# Patient Record
Sex: FEMALE | Race: WHITE
[De-identification: names, ages, dates, MRNs, and addresses within clinical notes are randomized per-mention and may not be internally consistent; named-entity substitution may affect disease eponyms.]

---

## 2017-04-22 NOTE — PHYS DOC
Past Medical History


Past Medical History:  CAD, Cancer, MI


Additional Past Medical Histor:  CARDIAC ISSUES, RADIATION AND CHEMO FOR RIGHT 

LUNG CA


Past Surgical History:  Coronary Bypass Surgery, Knee Replacement


Alcohol Use:  None


Drug Use:  None





Adult General


Chief Complaint


Chief Complaint:  MECHANICAL FALL





HPI


HPI


Patient is a 62  year old female who presents with for left elbow and forearm 

lacerations, pain and bleeding; and bilateral knee pain after trip and fall at 

a store.  She tripped on a floor mat.  She notes pain to bilateral anterior 

knees that are achy, constant; but she was able to walk without difficulty.  

She notes falling into a wall and hitting her forehead and right shoulder, but 

has no headache or shoulder pain.  She denies LOC, neck pain, vision changes, 

dizziness, back pain, chest pain, dyspnea, numbness, tingling, weakness.  





Tdap up to date.





Review of Systems


Review of Systems





Constitutional: Denies fever or chills []


Eyes: Denies change in visual acuity, redness, or eye pain []


HENT: Denies nasal congestion or sore throat []


Respiratory: Denies cough or shortness of breath []


Cardiovascular: No additional information not addressed in HPI []


GI: Denies abdominal pain, nausea, vomiting, bloody stools or diarrhea []


: Denies dysuria or hematuria []


Musculoskeletal: Denies back pain or joint pain []


Integument: Denies rash or skin lesions []


Neurologic: Denies headache, focal weakness or sensory changes []


Endocrine: Denies polyuria or polydipsia []





Current Medications


Current Medications





 Current Medications








 Medications


  (Trade)  Dose


 Ordered  Sig/Everett  Start Time


 Stop Time Status Last Admin


Dose Admin


 


 Lidocaine/


 Epinephrine


  (Xylocaine


 1%-Epi 1:100,000)  20 ml  1X  ONCE  4/22/17 17:30


 4/22/17 17:31 DC 4/22/17 18:21


20 ML


 


 Oxycodone HCl


  (Roxicodone)  5 mg  1X  ONCE  4/22/17 18:45


 4/22/17 18:46 DC 4/22/17 18:41


5 MG











Allergies


Allergies





 Allergies








Coded Allergies Type Severity Reaction Last Updated Verified


 


  tramadol Allergy Severe Seizures 3/27/17 Yes


 


  Sulfa (Sulfonamide Antibiotics) Allergy Intermediate Itching & GI upset 3/27/

17 Yes


 


  lorazepam Allergy Intermediate Hives 3/27/17 Yes


 


  propoxyphene napsylate Allergy Intermediate Hives & GI upset 3/27/17 Yes


 


  I S O L A T I O N *CONTACT* Allergy Unknown  3/27/17 Yes


 


  morphine Adverse Reaction Severe  3/27/17 Yes


 


  fluticasone propionate Adverse Reaction Intermediate Anxious 3/27/17 Yes


 


  ibuprofen Adverse Reaction Intermediate NAUSEA/VOMITING 3/27/17 Yes


 


  salmeterol xinafoate Adverse Reaction Intermediate Anxious 3/27/17 Yes











Physical Exam


Physical Exam





Constitutional: Well developed, well nourished, no acute distress, non-toxic 

appearance. []


HENT: Normocephalic, atraumatic, bilateral external ears normal, oropharynx 

moist, no oral exudates, nose normal. []


Eyes: PERRLA, EOMI, conjunctiva normal, no discharge. [] 


Neck: Normal range of motion, no tenderness, supple. [] 


Cardiovascular:Heart rate regular rhythm []


Lungs & Thorax:  Bilateral breath sounds clear to auscultation []


Abdomen: Bowel sounds normal, soft, no tenderness. [] 


Skin: Warm, dry, no erythema, no rash. [] 


Back: No tenderness, no CVA tenderness. [] 


Extremities: No bony tenderness, ROM intact at all joints.  Has some ecchymosis 

to left anterior knee and left elbow. Left forearm with 4 cm of abrasion 

overlying 2 cm gaping laceration, left elbow with 4cm L shaped laceration.  

Intact distal pulses. Sensation intact to light touch in all 4 limbs grossly.


Neurologic: Alert and oriented X 3, normal motor function, normal sensory 

function, no focal deficits noted. Ambulatory with a steady gait.  []


Psychologic: Affect normal, judgement normal, mood normal. []





Current Patient Data


Vital Signs





 Vital Signs








  Date Time  Temp Pulse Resp B/P Pulse Ox O2 Delivery O2 Flow Rate FiO2


 


4/22/17 19:00  86 20 106/54 97 Room Air  


 


4/22/17 16:13 97.8       





 97.8       











Course & Med Decision Making


Course & Med Decision Making


Pertinent Labs and Imaging studies reviewed. (See chart for details)





Lacerations repaired without complication.  Recommended CT head for minor head 

injury, but she refused.  Anticipatory guidance given for wound care and likely 

development of delayed onset muscle soreness.  Encouraged close follow up.  

Strict return precautions given.  She understands and agrees with plan.





Dragon Disclaimer


Dragon Disclaimer


This electronic medical record was generated, in whole or in part, using a 

voice recognition dictation system.





Laceration Repair


Lac Repair


Indication: Left elbow and left forearm lacerations





Procedure: The patient was placed in the appropriate position and anesthesia 

around the elbow laceration was 4ml lidocaine with epinephrine, forearm 

laceration was 4 mL lidocaine with epinephrine.  The area was then cleaned with 

pressure normal saline. The elbow laceration was closed with 4 simple 

interrupted sutures, Vicryl 3-0. The forearm laceration was closed with 3 

simple interrupted sutures, Vicryl 3-0. The wound area was then dressed with 

nonadherent bandage.  





Total repaired wound length: 4 cm left elbow laceration; 2 cm left forearm 

laceration. 





The patient tolerated the procedure well.





Complications: none.





Departure


Departure


Impression:  


 Primary Impression:  


 Laceration of elbow


 Additional Impressions:  


 Laceration of forearm


 Closed head injury


 Contusion, knee


Disposition:  01 HOME, SELF-CARE


Condition:  STABLE


Referrals:  


ALBAN WOODS MD (PCP)


Patient Instructions:  Sutured Wound Care, Easy-to-Read





Additional Instructions:


Your stitches need to be removed within 14 days. Follow-up with your primary 

care doctor. Return for any concerns.





Problem Qualifiers








 Primary Impression:  


 Laceration of elbow


 Encounter type:  initial encounter  Laterality:  left  Qualified Code:  

S51.012A - Laceration without foreign body of left elbow, initial encounter


 Additional Impressions:  


 Laceration of forearm


 Encounter type:  initial encounter  Laterality:  left  Qualified Code:  

S51.812A - Laceration without foreign body of left forearm, initial encounter


 Closed head injury


 Encounter type:  initial encounter  Qualified Code:  S09.90XA - Unspecified 

injury of head, initial encounter


 Contusion, knee


 Encounter type:  initial encounter  Laterality:  unspecified laterality  

Qualified Code:  S80.00XA - Contusion of unspecified knee, initial encounter





YAKELIN AMATO MD Apr 22, 2017 19:02

## 2017-05-19 NOTE — RAD
DATE: 5/19/2017



EXAM: DIGITAL SCREEN BILAT W/CAD



HISTORY: Routine screening



COMPARISON: None available  this is dictated as a baseline exam.



This study was interpreted with the benefit of Computerized Aided Detection

(CAD).







FINDINGS:

Breast Density:  FATTY  The Breast Parenchyma is primarily fatty replaced.

Breast parenchyma level density A..  There are no dominant suspicious masses,

suspicious microcalcifications or evidence of architectural distortion.



 Benign-appearing calcification identified in the right breast  







IMPRESSION: Benign findings







BI-RADS CATEGORY: 2 BENIGN FINDING



RECOMMENDED FOLLOW-UP: 12M 12 MONTH FOLLOW-UP



PQRS compliance statement: Patient information was entered into a reminder

system with a target due date 5/19/2018 for the next mammogram.



Mammography is a sensitive method for finding small breast cancers, but it

does not detect them all and is not a substitute for careful clinical

examination.  A negative mammogram does not negate a clinically suspicious

finding and should not result in delay in biopsying a clinically suspicious

abnormality.



"Our facility is accredited by the American [employer] Mammography

Program."

## 2017-05-19 NOTE — RAD
Fluoroscopically guided right hip joint injection, 5/19/2017:



History: Hip pain



Under local anesthesia, aseptic conditions and fluoroscopic guidance a

22-gauge spinal needle was passed into the right hip joint via an anterior

approach. A small amount of iodinated contrast material was injected to

confirm intra-articular positioning following which 80 mg of Depo-Medrol mixed

with 4 cc of 0.5% Sensorcaine was injected into the joint as requested. The

spinal needle was then removed and hemostasis obtained. One fluoroscopic spot

image was recorded. 0.6 minutes of fluoroscopy time were utilized. The patient

tolerated the procedure well and left the department in good condition.

## 2018-09-30 NOTE — RAD
EXAM: CHEST 1 VIEW 

 

History: Shortness of breath 

 

COMPARISON: None available

 

TECHNIQUE: Single portable radiograph of the chest

 

FINDINGS:  Mild cardiomegaly. Median sternotomy wires identified. Mild 

diffuse prominent appearing interstitial lung markings could be mild 

congestive changes or interstitial infiltrates.

 

 

Impression:

 

Mild prominent bilateral interstitial lung markings could be mild 

congestive changes or interstitial infiltrates.

 

 

Electronically signed by: Brian Merritt MD (9/30/2018 7:58 AM) [facility]

## 2018-09-30 NOTE — HP
ADMIT DATE:  09/30/2018



LOCATION:  [facility].



REASON FOR ADMISSION TO THE HOSPITAL:  Shortness of breath, cough and wheezing,

COPD with exacerbation.



HISTORY OF PRESENT ILLNESS:  The patient is a 63-year-old female patient known

to me who has chronic COPD and she has history of coronary artery disease,

bypass surgery.  She has history of lung cancer, radiation treatment 5 years

ago.  She still continues to smoke, has cough with green phlegm, came to the

Emergency Room, was having short of breath, was given breathing treatment with

some improvement.  Chest x-ray was done, shows some questionable infiltrate in

the lung, was admitted to the hospital.



PAST MEDICAL HISTORY:  History of COPD, coronary artery disease, lung cancer,

heart attack in the past, anxiety, depression.



PAST SURGICAL HISTORY:  Knee replacement, heart bypass surgery, radiation for

lung cancer.



ALLERGIES:  TO SULFA, FLUTICASONE, IBUPROFEN, LORAZEPAM, MORPHINE, DARVOCET,

SALMETEROL XINAFOATE.



MEDICATIONS AT HOME:  Albuterol 4 times daily, Xanax 0.5 q.6h., atorvastatin 40

mg daily, budesonide twice a day, Soma 350 mg 3 times daily, Zetia 10 mg daily,

fluoxetine 40 mg daily, metoprolol 25 mg daily, multivitamin daily, omeprazole

20 mg daily, Zofran for nausea, oxycodone 50 mg 3 times daily, potassium 10 mEq

daily, Phenergan with codeine cough syrup p.r.n., mirtazapine and doxepin, I am

not sure she is taking those.  She is on home oxygen at nighttime.



PERSONAL HISTORY:  Smoked for at least 30 years, cutting down, still smokes

occasionally.  Denies alcohol or street drugs.  The patient is on chronic

narcotic pain medications.



FAMILY HISTORY:  Positive for heart disease, lung problems.



REVIEW OF SYMPTOMS:

CARDIAC:  No chest pain.

GASTROINTESTINAL:  No nausea or vomiting.

RESPIRATORY:  Complains of cough, sputum, wheezing and low grade fever.

Rest of her systems reviewed and negative.



PHYSICAL EXAMINATION:

VITAL SIGNS:  At the time of admission shows temperature 97, pulse 85,

respirations 20, blood pressure 118/60, 95% on room air on 2 liters.

HEENT:  Head is atraumatic.  Pupils equal.  Oral cavity, congestion posterior

pharynx.

NECK:  Supple.  Thyroid not enlarged.  JVD not elevated.

CHEST:  Asymmetrical, scar of heart surgery.

CARDIOVASCULAR:  S1, S2.

LUNGS:  Bilateral wheezing, both inspiratory, expiratory mostly on the posterior

lungs.

ABDOMEN:  Soft, no mass palpable.

EXTERNAL GENITALIA:  No Orellana.

RECTAL:  Deferred.

EXTREMITIES:  No calf tenderness, no edema.  Pulses 1+.

NEUROLOGIC:  Moving all extremities.  No focal deficits noted.



LABORATORY DATA:  Shows a white count 8, hemoglobin 9, platelets 306. 

Electrolytes sodium 145, potassium 3.5, chloride 104, bicarb 33, BUN 12,

creatinine 0.8, glucose 99.  LFTs normal.  BNP was 30-50, pH 7.39, pCO2 51, pO2

87, bicarbonate 30, 96 saturation.  Chest x-ray done, report is pending.  EKG

not done.



FINAL IMPRESSION:

1.  Chronic obstructive pulmonary disease with acute exacerbation.

2.  Chronic hypercapnic respiratory failure, stable.

3.  Coronary artery disease, history of bypass surgery.

4.  History of lung cancer, had radiation treatment.

5.  Chronic smoker.

6.  Hypertension.

7.  Hyperlipidemia.

8.  Anxiety, depression.

9. Elevated BNP,?chf



PLAN:  At this time, admit to hospital, hydrate with IV fluids.  Sputum

cultures.  Start on Rocephin, IV Solu-Medrol, DuoNeb 4 times daily.  DVT

prevention with Lovenox and see how the patient's condition improves.

Echo and cardiology consult.

 



______________________________

ALBAN WOODS MD



DR:  LINETTE/rosalind  JOB#:  [id number] / [id number]

DD:  09/30/2018 12:07  DT:  09/30/2018 14:08

TYREE

## 2018-09-30 NOTE — PHYS DOC
Past Medical History


Past Medical History:  CAD, Cancer, MI


Additional Past Medical Histor:  CARDIAC ISSUES, RADIATION AND CHEMO FOR RIGHT 

LUNG CA, CHRONIC BACK PAIN


Past Surgical History:  Coronary Bypass Surgery, Knee Replacement


Alcohol Use:  None


Drug Use:  None





Adult General


Chief Complaint


Chief Complaint:  SHORTNESS OF BREATH





HPI


HPI


Patient is a 63  year old female with history of COPD, congestive heart failure 

and remote history of lung cancer presents with progressive shortness of breath

, productive cough yellow sputum dyspnea with exertion while supine for the 

past 2 days.  denies nausea, vomiting, fever chills and sweats. Denies 

increased leg pain or swelling. Patient is an occasional smoker. Limited 

improvement with inhalers at home.[]





Review of Systems


Review of Systems


ROS asp erp HPI


All other systems were reviewed and found to be within normal limits, except as 

documented in this note.





Current Medications


Current Medications





Current Medications








 Medications


  (Trade)  Dose


 Ordered  Sig/Everett  Start Time


 Stop Time Status Last Admin


Dose Admin


 


 Albuterol Sulfate


  (Ventolin Neb


 Soln)  10 mg  1X  ONCE  9/30/18 03:00


 9/30/18 03:01 DC 9/30/18 03:00


10 MG


 


 Albuterol/


 Ipratropium


  (Duoneb)  3 ml  RTQID  9/30/18 08:00


 10/1/18 07:59   





 


 Fentanyl Citrate


  (Fentanyl 2ml


 Vial)  25 mcg  1X  ONCE  9/30/18 04:30


 9/30/18 04:31   





 


 Furosemide


  (Lasix)  20 mg  BID92  9/30/18 09:00


     





 


 Ipratropium


 Bromide


  (Atrovent)  1 mg  1X  ONCE  9/30/18 03:00


 9/30/18 03:01 DC 9/30/18 03:00


1 MG


 


 Ondansetron HCl


  (Zofran)  4 mg  1X  ONCE  9/30/18 04:30


 9/30/18 04:31   














Allergies


Allergies





Allergies








Coded Allergies Type Severity Reaction Last Updated Verified


 


  tramadol Allergy Severe Seizures 3/27/17 Yes


 


  Sulfa (Sulfonamide Antibiotics) Allergy Intermediate Itching & GI upset 3/27/

17 Yes


 


  lorazepam Allergy Intermediate Hives 3/27/17 Yes


 


  propoxyphene napsylate Allergy Intermediate Hives & GI upset 3/27/17 Yes


 


  I S O L A T I O N *CONTACT* Allergy Unknown  3/27/17 Yes


 


  morphine Adverse Reaction Severe  3/27/17 Yes


 


  fluticasone propionate Adverse Reaction Intermediate Anxious 3/27/17 Yes


 


  ibuprofen Adverse Reaction Intermediate NAUSEA/VOMITING 3/27/17 Yes


 


  salmeterol xinafoate Adverse Reaction Intermediate Anxious 3/27/17 Yes











Physical Exam


Physical Exam





Constitutional: Well developed, well nourished, no acute distress, non-toxic 

appearance. []


HENT: Normocephalic, atraumatic, bilateral external ears normal, oropharynx 

moist, no oral exudates, was hoarseness. []


Eyes: PERRLA, EOMI, conjunctiva normal, no discharge. [] 


Neck: Normal range of motion, no tenderness, supple, no stridor. [] 


Cardiovascular:Heart rate regular rhythm, no murmur []


Lungs & Thorax:, Mild tachypnea, diminished coarse breath sounds bilaterally 

with inspiratory and expiratory wheezes.[]


Abdomen: Bowel sounds normal, soft, no tenderness, no masses, no pulsatile 

masses. [] 


Skin: Warm, dry, no erythema, no rash. [] 


Back: No tenderness, no CVA tenderness. [] 


Extremities: No tenderness, no cyanosis, no clubbing, ROM intact, no edema. [] 


Neurologic: Alert and oriented X 3, normal motor function, normal sensory 

function, no focal deficits noted. []


Psychologic: Affect normal, judgement normal, mood normal. []





Current Patient Data


Vital Signs





 Vital Signs








  Date Time  Temp Pulse Resp B/P (MAP) Pulse Ox O2 Delivery O2 Flow Rate FiO2


 


9/30/18 03:07     97 Nasal Cannula 2.0 


 


9/30/18 02:15 97.8 85 20 118/60 (79)    





 97.8       








Lab Values





 Laboratory Tests








Test


 9/30/18


02:20 9/30/18


02:42


 


White Blood Count


 7.9 x10^3/uL


(4.0-11.0) 





 


Red Blood Count


 3.77 x10^6/uL


(3.50-5.40) 





 


Hemoglobin


 9.3 g/dL


(12.0-15.5)  L 





 


Hematocrit


 29.2 %


(36.0-47.0)  L 





 


Mean Corpuscular Volume


 78 fL ()


L 





 


Mean Corpuscular Hemoglobin 25 pg (25-35)   


 


Mean Corpuscular Hemoglobin


Concent 32 g/dL


(31-37) 





 


Red Cell Distribution Width


 16.5 %


(11.5-14.5)  H 





 


Platelet Count


 306 x10^3/uL


(140-400) 





 


Neutrophils (%) (Auto) 80 % (31-73)  H 


 


Lymphocytes (%) (Auto) 11 % (24-48)  L 


 


Monocytes (%) (Auto) 6 % (0-9)   


 


Eosinophils (%) (Auto) 3 % (0-3)   


 


Basophils (%) (Auto) 1 % (0-3)   


 


Neutrophils # (Auto)


 6.3 x10^3uL


(1.8-7.7) 





 


Lymphocytes # (Auto)


 0.8 x10^3/uL


(1.0-4.8)  L 





 


Monocytes # (Auto)


 0.5 x10^3/uL


(0.0-1.1) 





 


Eosinophils # (Auto)


 0.2 x10^3/uL


(0.0-0.7) 





 


Basophils # (Auto)


 0.1 x10^3/uL


(0.0-0.2) 





 


Sodium Level


 145 mmol/L


(136-145) 





 


Potassium Level


 3.5 mmol/L


(3.5-5.1) 





 


Chloride Level


 104 mmol/L


() 





 


Carbon Dioxide Level


 33 mmol/L


(21-32)  H 





 


Anion Gap 8 (6-14)   


 


Blood Urea Nitrogen


 12 mg/dL


(7-20) 





 


Creatinine


 0.8 mg/dL


(0.6-1.0) 





 


Estimated GFR


(Cockcroft-Gault) 72.9  


 





 


BUN/Creatinine Ratio 15 (6-20)   


 


Glucose Level


 99 mg/dL


(70-99) 





 


Lactic Acid Level


 0.9 mmol/L


(0.4-2.0) 





 


Calcium Level


 8.5 mg/dL


(8.5-10.1) 





 


Total Bilirubin


 0.3 mg/dL


(0.2-1.0) 





 


Aspartate Amino Transferase


(AST) 18 U/L (15-37)


 





 


Alanine Aminotransferase (ALT)


 20 U/L (14-59)


 





 


Alkaline Phosphatase


 125 U/L


()  H 





 


Troponin I Quantitative


 < 0.017 ng/mL


(0.000-0.055) 





 


NT-Pro-B-Type Natriuretic


Peptide 3250 pg/mL


(0-124)  H 





 


Total Protein


 6.6 g/dL


(6.4-8.2) 





 


Albumin


 3.0 g/dL


(3.4-5.0)  L 





 


Albumin/Globulin Ratio


 0.8 (1.0-1.7)


L 





 


O2 Saturation  96 % (92-99)  


 


Arterial Blood pH


 


 7.39


(7.35-7.45)


 


Arterial Blood pCO2 at


Patient Temp 


 51 mmHg


(35-46)  H


 


Arterial Blood pO2 at Patient


Temp 


 87 mmHg


()


 


Arterial Blood HCO3


 


 30 mmol/L


(21-28)  H


 


Arterial Blood Base Excess


 


 5 mmol/L


(-3-3)  H


 


FiO2  28  





 Laboratory Tests


9/30/18 02:20








 Laboratory Tests


9/30/18 02:20














EKG


EKG


[EKG: Reviewed]





Radiology/Procedures


Radiology/Procedures


[Chest x-ray: Cardiomegaly with pulmonary vascular congestion.]





Course & Med Decision Making


Course & Med Decision Making


Pertinent Labs and Imaging studies reviewed. (See chart for details)





[Lasix, steroids, breathing treatment given. Patient requiring oxygen will 

admit to Dr. Juniro's service]





Dragon Disclaimer


Dragon Disclaimer


This electronic medical record was generated, in whole or in part, using a 

voice recognition dictation system.





Departure


Departure


Impression:  


 Primary Impression:  


 COPD with exacerbation


 Additional Impression:  


 CHF exacerbation


Disposition:  09 ADMITTED AS INPATIENT


Admitting Physician:  Kathy Junior


Condition:  STABLE


Referrals:  


KATHY JUNIOR MD (PCP)





Problem Qualifiers











VANITATHIERNO MCCLELLAN Sep 30, 2018 04:34

## 2018-09-30 NOTE — EKG
[facility]

              [address] [place] [address]

Test Date:    2018               Test Time:    02:26:06

Pat Name:     KASSIE JAMA         Department:   

Patient ID:   [id number]           Room:          

Gender:       F                        Technician:   

:          [id number]               Requested By: THIERNO WALDRON

Order Number: [id number]           Reading MD:     

                                 Measurements

Intervals                              Axis          

Rate:         77                       P:            50

IL:           170                      QRS:          21

QRSD:         86                       T:            59

QT:           420                                    

QTc:          477                                    

                           Interpretive Statements

SINUS RHYTHM

INCOMPLETE RIGHT BUNDLE BRANCH BLOCK

PROLONGED QT

NO SPECIFIC ECG ABNORMALITIES

RI6.01

No previous ECG available for comparison

## 2018-09-30 NOTE — PHYS DOC
Adult General


Chief Complaint


Chief Complaint:  SHORTNESS OF BREATH





HPI


HPI





Patient is a 63  year old female with history of COPD, congestive heart failure 

presents with progressive shortness of breath over the past 48 hours. Patient 

reports productive sputum with yellow mucus. No fever chills, nausea vomiting 

or sweats. No chest pain, chest tightness. Denies increased leg pain or 

swelling.[]





Review of Systems


Review of Systems


Review symptoms as per history of present illness. All other review symptoms 

are negative. Worse


All other systems were reviewed and found to be within normal limits, except as 

documented in this note.





Current Medications


Current Medications





Current Medications








 Medications


  (Trade)  Dose


 Ordered  Sig/Everett  Start Time


 Stop Time Status Last Admin


Dose Admin


 


 Albuterol Sulfate


  (Ventolin Neb


 Soln)  2.5 mg  STK-MED ONCE  9/30/18 03:00


 9/30/18 03:01 DC  





 


 Ipratropium


 Bromide


  (Atrovent)  0.5 mg  STK-MED ONCE  9/30/18 03:00


 9/30/18 03:01 DC  














Physical Exam


Physical Exam





Constitutional: Well developed, well nourished, no acute distress, non-toxic 

appearance. []


HENT: Normocephalic, atraumatic, bilateral external ears normal, oropharynx 

moist, no oral exudates, nose normal. []


Eyes: PERRLA, EOMI, conjunctiva normal, no discharge. [] 


Neck: Normal range of motion, no tenderness, supple, no stridor. [] 


Cardiovascular:Heart rate regular rhythm, no murmur , negative Homans signs.[]


Lungs & Thorax: Respirations nonlabored, mildly diminished breath sounds 

bilaterally occasional expiratory wheeze. []


Abdomen: Bowel sounds normal, soft, no tenderness.[] 


Skin: Warm, dry, no erythema, no rash. [] 


Back: No tenderness, no CVA tenderness. [] 


Extremities: No tenderness, no cyanosis, no clubbing, ROM intact, no edema. [] 


Neurologic: Alert and oriented X 3, normal motor function, normal sensory 

function, no focal deficits noted. []


Psychologic: Affect normal, judgement normal, mood normal. []





EKG


EKG


[]





Radiology/Procedures


Radiology/Procedures


[CXR: Pulmonary vascular congestion, cardiomegaly on preliminary ED review.]





Course & Med Decision Making


Course & Med Decision Making


Pertinent Labs and Imaging studies reviewed. (See chart for details)





[Lasix, breathing treatment, steroids given with some improvement. Will admit 

to the hospital service for further evaluation and treatment. Courtesy bridge 

orders provided.]





Dragon Disclaimer


Dragon Disclaimer


This electronic medical record was generated, in whole or in part, using a 

voice recognition dictation system.





Departure


Departure


Impression:  


 Primary Impression:  


 COPD (chronic obstructive pulmonary disease)


 Additional Impression:  


 Congestive heart failure


Disposition:  09 ADMITTED AS INPATIENT


Admitting Physician:  Other (Tata )


Condition:  IMPROVED





Problem Qualifiers











THIERNO WALDRON DO Sep 30, 2018 06:08

## 2018-09-30 NOTE — RAD
Chest, PA and Lateral:

 

Technique:  PA and lateral views of the chest were obtained.

 

History:  Pneumonia.

 

Comparison: 4/1/2018.

 

Findings:

 

 Unchanged heart size. There are patchy airspace opacities identified in 

the right hilar region, left hilar region and in the left lung base likely

atelectasis or infiltrates with mild prominent appearing bilateral 

interstitial lung markings.

 

IMPRESSION:

 

Prominent appearing interstitial lung markings identified in the bilateral

lungs probably chronic interstitial changes with airspace opacities in the

right hilar, left hilar and left lung base likely atelectasis or 

infiltrates. Follow-up to resolution.

 

Electronically signed by: Brian Merritt MD (9/30/2018 2:49 PM) [facility]

## 2018-09-30 NOTE — PDOC
Provider Note


Provider Note


Pt seen.H&P dictated.


#[id number].











ALBAN WOODS MD Sep 30, 2018 12:09

## 2018-09-30 NOTE — EKG
[facility]

              [address] [place] [address]

Test Date:    2018               Test Time:    15:48:46

Pat Name:     WILEY FERNANDO         Department:   

Patient ID:   [id number]           Room:         9 

Gender:       F                        Technician:   [email or web address]

:          1955               Requested By: ALBAN WOODS

Order Number: [id number]           Reading MD:   Vu Johnson MD

                                 Measurements

Intervals                              Axis          

Rate:         85                       P:            63

NY:           158                      QRS:          34

QRSD:         88                       T:            86

QT:           404                                    

QTc:          481                                    

                           Interpretive Statements

SINUS RHYTHM



Electronically Signed On 10-1-2018 14:03:16 CDT by Vu Johnson MD

## 2018-10-01 NOTE — PDOC
PROGRESS NOTES


Subjective


Subjective


feels better today





Objective


Objective





Vital Signs








  Date Time  Temp Pulse Resp B/P (MAP) Pulse Ox O2 Delivery O2 Flow Rate FiO2


 


10/1/18 09:17     97 Nasal Cannula 2.0 


 


10/1/18 09:12  94  174/67    


 


10/1/18 07:00 97.9  24     





 97.9       














Intake and Output 


 


 10/1/18





 07:00


 


Intake Total 780 ml


 


Output Total 600 ml


 


Balance 180 ml


 


 


 


Intake Oral 780 ml


 


Output Urine Total 600 ml











Physical Exam


Abdomen:  Normal bowel sounds, Soft


Heart:  Normal S2


Extremities:  No clubbing


General:  Alert


HEENT:  Atraumatic


Lungs:  Other (wheezing)


MUSCULOSKELETAL:  No deformity


Neck:  No JVD


Neuro:  Normal speech


Psych/Mental Status:  Mental status NL


Skin:  No breakdown





Diagnosis


Problem List


Problems


Medical Problems:


(1) CHF exacerbation


Status: Acute  





(2) COPD with exacerbation


Status: Acute  











Assessment


Assessment


Problems


Medical Problems:


(1) CHF exacerbation


Status: Acute  





(2) COPD with exacerbation


Status: Acute  








FINAL IMPRESSION:


1.  Chronic obstructive pulmonary disease with acute exacerbation.


2.  Chronic hypercapnic respiratory failure, stable.


3.  Coronary artery disease, history of bypass surgery.


4.  History of lung cancer, had radiation treatment.


5.  Chronic smoker.


6.  Hypertension.


7.  Hyperlipidemia.


8.  Anxiety, depression.


9. Mone BNP, ?chf





PLAN: 


ECHO today


cardiology consult


iv steroids


duoneb.


iv rocephin.


pt/ot





 At this time, admit to hospital, hydrate with IV fluids.  Sputum


cultures.  Start on Rocephin, IV Solu-Medrol, DuoNeb 4 times daily.  DVT


prevention with Lovenox and see how the patient's condition improves.





Plan


Plan of Care


Problems


Medical Problems:


(1) CHF exacerbation


Status: Acute  





(2) COPD with exacerbation


Status: Acute  








Comment


Review of Relevant


I have reviewed the following items tonia (where applicable) has been applied.


Labs





Laboratory Tests








Test


 10/1/18


03:55


 


White Blood Count


 7.2 x10^3/uL


(4.0-11.0)


 


Red Blood Count


 3.60 x10^6/uL


(3.50-5.40)


 


Hemoglobin


 8.8 g/dL


(12.0-15.5)


 


Hematocrit


 28.2 %


(36.0-47.0)


 


Mean Corpuscular Volume 78 fL () 


 


Mean Corpuscular Hemoglobin 24 pg (25-35) 


 


Mean Corpuscular Hemoglobin


Concent 31 g/dL


(31-37)


 


Red Cell Distribution Width


 17.0 %


(11.5-14.5)


 


Platelet Count


 298 x10^3/uL


(140-400)


 


Neutrophils (%) (Auto) 94 % (31-73) 


 


Lymphocytes (%) (Auto) 4 % (24-48) 


 


Monocytes (%) (Auto) 1 % (0-9) 


 


Eosinophils (%) (Auto) 0 % (0-3) 


 


Basophils (%) (Auto) 0 % (0-3) 


 


Neutrophils # (Auto)


 6.8 x10^3uL


(1.8-7.7)


 


Lymphocytes # (Auto)


 0.3 x10^3/uL


(1.0-4.8)


 


Monocytes # (Auto)


 0.1 x10^3/uL


(0.0-1.1)


 


Eosinophils # (Auto)


 0.0 x10^3/uL


(0.0-0.7)


 


Basophils # (Auto)


 0.0 x10^3/uL


(0.0-0.2)


 


Segmented Neutrophils % 94 % (35-66) 


 


Band Neutrophils % 1 % (0-9) 


 


Lymphocytes % 4 % (24-48) 


 


Monocytes % 1 % (0-10) 


 


Platelet Estimate


 Adequate


(ADEQUATE)


 


Hypochromasia Present 


 


Sodium Level


 143 mmol/L


(136-145)


 


Potassium Level


 4.5 mmol/L


(3.5-5.1)


 


Chloride Level


 105 mmol/L


()


 


Carbon Dioxide Level


 26 mmol/L


(21-32)


 


Anion Gap 12 (6-14) 


 


Blood Urea Nitrogen


 17 mg/dL


(7-20)


 


Creatinine


 0.9 mg/dL


(0.6-1.0)


 


Estimated GFR


(Cockcroft-Gault) 63.7 





 


Glucose Level


 195 mg/dL


(70-99)


 


Calcium Level


 8.2 mg/dL


(8.5-10.1)








Medications





Current Medications


Albuterol Sulfate (Ventolin Neb Soln) 2.5 mg PRN Q6HRS  PRN NEB SHORTNESS OF 

BREATH;  Start 9/30/18 at 11:00


Albuterol/ Ipratropium (Duoneb) 3 ml RTQID  PRN NEB SHORTNESS OF BREATH;  Start 

9/30/18 at 12:15;  Status UNV


Alprazolam (Xanax) 0.5 mg PRN Q6HRS  PRN PO ANXIETY / AGITATION;  Start 9/30/18 

at 10:45


Atorvastatin Calcium (Lipitor) 40 mg HS PO  Last administered on 9/30/18at 21:15

;  Start 9/30/18 at 21:00


Carisoprodol (Soma) 350 mg TID PO  Last administered on 10/1/18at 09:12;  Start 

9/30/18 at 14:00


Ceftriaxone Sodium 1 gm/ Dextrose 50 ml @  100 mls/hr Q24H IV ;  Start 9/30/18 

at 12:00;  Status UNV


Ceftriaxone Sodium (Rocephin) 1 gm Q24H IVP  Last administered on 9/30/18at 14:

41;  Start 9/30/18 at 13:00


Enoxaparin Sodium (Lovenox 40mg Syringe) 40 mg Q12HR SQ  Last administered on 10

/1/18at 09:13;  Start 9/30/18 at 11:30


EZETIMIBE (Zetia) 10 mg DAILY PO  Last administered on 10/1/18at 09:12;  Start 9 /30/18 at 11:30


Fluoxetine HCl (PROzac) 40 mg DAILY PO  Last administered on 10/1/18at 09:12;  

Start 9/30/18 at 11:30


Lactobacillus Rhamnosus (Culturelle) 1 cap BID PO  Last administered on 10/1/

18at 09:12;  Start 9/30/18 at 13:00


Methylprednisolone Sodium Succinate (SOLU-Medrol 40MG VIAL) 40 mg Q8HRS IV  

Last administered on 10/1/18at 06:00;  Start 9/30/18 at 14:00


Metoprolol Succinate (Toprol Xl) 25 mg DAILY PO  Last administered on 10/1/18at 

09:12;  Start 9/30/18 at 11:30


Multivitamins (Thera M Plus) 1 tab DAILY PO  Last administered on 10/1/18at 09:

12;  Start 9/30/18 at 11:30


Non-Formulary Medication (Budesonide/ Formoterol Fumarate (Symbicort 160-4.5 

Mcg Inhaler)) 2 puff PRN  BID IH ;  Start 9/30/18 at 10:45;  Status UNV


Non-Formulary Medication ([potassium] ) 1 tab DAILY PO ;  Start 10/1/18 at 09:00

;  Status UNV


Ondansetron HCl (Zofran Odt) 4 mg PRN BID  PRN PO NAUSEA/VOMITING;  Start 9/30/ 18 at 10:45


Oxycodone HCl (Roxicodone) 15 mg PRN TID  PRN PO PAIN Last administered on 10/1/

18at 07:40;  Start 9/30/18 at 11:00


Pantoprazole Sodium (Protonix) 40 mg DAILYAC PO  Last administered on 10/1/18at 

07:36;  Start 9/30/18 at 11:30


Promethazine HCl/ Codeine (Phenergan With Codeine Oral Syrup) 5 ml PRN Q6HRS  

PRN PO COUGH;  Start 9/30/18 at 11:00


Sodium Chloride 1,000 ml @  75 mls/hr [id number] IV  Last administered on 10/1/

18at 01:50;  Start 9/30/18 at 12:30


Vitals/I & O





Vital Sign - Last 24 Hours








 9/30/18 9/30/18 9/30/18 9/30/18





 11:15 11:17 11:40 14:42


 


Temp  97.9  





  97.9  


 


Pulse  84 84 


 


Resp  20  


 


B/P (MAP)  112/43 (66) 112/43 


 


Pulse Ox  92  92


 


O2 Delivery Nasal Cannula Nasal Cannula  Nasal Cannula


 


O2 Flow Rate 1.0 2.0  2.0


 


    





    





 9/30/18 9/30/18 9/30/18 9/30/18





 15:09 15:15 19:16 20:00


 


Temp 98.4  98.1 





 98.4  98.1 


 


Pulse 93  82 


 


Resp 22  18 


 


B/P (MAP) 111/36 (61)  119/67 (84) 


 


Pulse Ox 93  96 


 


O2 Delivery Nasal Cannula Nasal Cannula Nasal Cannula Nasal Cannula


 


O2 Flow Rate 2.0 1.0 2.0 2.0


 


    





    





 9/30/18 9/30/18 10/1/18 10/1/18





 20:50 22:58 03:21 07:00


 


Temp  98.5 97.9 97.9





  98.5 97.9 97.9


 


Pulse  80 94 94


 


Resp  18 18 24


 


B/P (MAP)  137/78 (97) 98/64 (75) 174/67 (102)


 


Pulse Ox 96 96 96 91


 


O2 Delivery Nasal Cannula Room Air Nasal Cannula Nasal Cannula


 


O2 Flow Rate 2.0  2.0 2.0


 


    





    





 10/1/18 10/1/18 10/1/18 10/1/18





 07:40 07:58 09:12 09:17


 


Pulse   94 


 


B/P (MAP)   174/67 


 


Pulse Ox 96 97  97


 


O2 Delivery Nasal Cannula Nasal Cannula  Nasal Cannula


 


O2 Flow Rate 2.0 2.0  2.0














Intake and Output   


 


 9/30/18 9/30/18 10/1/18





 15:00 23:00 07:00


 


Intake Total  300 ml 480 ml


 


Output Total  600 ml 


 


Balance  -300 ml 480 ml

















ALBAN WOODS MD Oct 1, 2018 09:32

## 2018-10-01 NOTE — CARD
MR#: [id number]

Account#: [id number]

Accession#: [id number]

Date of Study: 10/01/2018

Ordering Physician: ALBAN WOODS, 

Referring Physician: ALBAN WOODS, 

Tech: Cheli Hernandez





--------------- APPROVED REPORT --------------





EXAM: Two-dimensional and M-mode echocardiogram with Doppler and color Doppler.



Other Information 

Quality : AverageHR: 85bpm

Rhythm : NSR



INDICATION

COPD  

Congestive Heart Failure 



2D DIMENSIONS 

Left Atrium(2D)4.5 (1.6-4.0cm)IVSd1.4 (0.7-1.1cm)

Aortic Root(2D)2.6 (2.0-3.7cm)LVDd4.6 (3.9-5.9cm)

LVOT Diameter2.2 (1.8-2.4cm)PWd1.2 (0.7-1.1cm)

IVSs2.5 (0.8-1.2cm)



Aortic Valve

AoV Peak Venkatesh.200.4cm/sAoV VTI40.9cm

AO Peak GR.16.1mmHgLVOT Peak Venkatesh.101.3cm/s

LVOT  VTI 21.63cmAO Mean GR.9mmHg

SILAS (VMAX)1.55mb5FPB   (VTI)1.95cm2

AI P 1/2 [id number]



Mitral Valve

MV E Lrcbvqaj702.0cm/sMV DECEL [id number]

MV A Kfvybirg149.7cm/sMV [id number]

E/A  Ratio1.4MVA (PHT)3.82cm2



TDI

E/Lateral E'20.8E/Medial E'19.5



Pulmonary Valve

PV Peak Oqmbscbz57.9cm/sPV Peak Grad.3mmHg



Tricuspid Valve

TR P. Imrgxgzu083no/sRAP [id number]

TR Peak Gr.21myEuKVZB54otUf



Pulmonary Vein

S1 Bnkzvpzv08.1cm/sD2 Qsrifnkl42.2cm/s



 LEFT VENTRICLE 

The left ventricle is normal size. There is borderline to mild concentric left ventricular hypertroph
y. The left ventricular systolic function is normal and the ejection fraction is within normal range.
 Left ventricular ejection fraction is 55-60%. There is normal LV segmental wall motion. Transmitral 
Doppler flow pattern is normal for age.



 RIGHT VENTRICLE 

The right ventricle is normal size. There is normal right ventricular wall thickness. The right ventr
icular systolic function is normal.



 ATRIA 

The left atrium is borderline dilated. The right atrium size is normal.



 AORTIC VALVE 

The aortic valve is not well visualized. Doppler and Color Flow revealed mild aortic regurgitation. C
alculated aortic valve area is 1.9 cm2 with maximum pressure gradient of 16 mmHg and mean pressure gr
adient of 9 mmHg. Minimal aortic stenosis.



 MITRAL VALVE 

The mitral valve is thickened but opens well. There is no mitral valve stenosis. Doppler and Color-fl
ow revealed mild to moderate mitral regurgitation.



 TRICUSPID VALVE 

The tricuspid valve is not well visualized. Doppler and Color Flow revealed trace tricuspid regurgita
tion.



 PULMONIC VALVE 

The pulmonic valve is not well visualized. Doppler and Color Flow revealed no pulmonic valvular regur
gitation.



 GREAT VESSELS 

The aortic root is normal in size. The IVC was not visualized.



 PERICARDIAL EFFUSION 

There is no evidence of significant pericardial effusion.



Critical Notification

Critical Value: No



<Conclusion>

The left ventricle is normal size.

The left ventricular systolic function is normal and the ejection fraction is within normal range.

Left ventricular ejection fraction is 55-60%.

There is borderline to mild concentric left ventricular hypertrophy.

Calculated aortic valve area is 1.9 cm2 with maximum pressure gradient of 16 mmHg and mean pressure g
radient of 9 mmHg.

Minimal aortic stenosis.

Doppler and Color Flow revealed mild aortic regurgitation.

Doppler and Color-flow revealed mild to moderate mitral regurgitation.

Doppler and Color Flow revealed trace tricuspid regurgitation.



Signed by : Jason Kennedy MD

Electronically Approved : 10/01/2018 13:46:52

## 2018-10-01 NOTE — PDOC2
JUAN HUMMEL APRN 10/1/18 1146:


CARDIAC CONSULT


DATE OF CONSULT


Date of Consult


DATE: 10/1/18 


TIME: 11:38





REASON FOR CONSULT


Reason for Consult:


CHF, History of CABG





REFERRING PHYSICIAN


Referring Physician:


Dr. Junior





SOURCE


Source:  Chart review, Patient





HISTORY OF PRESENT ILLNESS


HISTORY OF PRESENT ILLNESS


This is a 64 yo female who presented with complaints of shortness of breath. 

Reports having an episode of nausea and vomiting last week; inhaled some 

emesis. Breathing has been worse since. SOA has been progressively worsening. 

Has cough productive of green sputum. Denies any fevers/chills, chest pain, 

dizziness, diaphoresis, or palpitations. Orthopnea at base. Despite history of 

COPD and lung CA s/p chemo and radiation, continues to smoke 1/2 ppd. 

Additionally, has a history of CAD s/p CABG in 1996. Does not follow with 

cardiologist. Most recent cardiac workup noted in 2014. Reports bilateral lower 

extremity edema x 2 months along with fatigue.





PAST MEDICAL HISTORY


Cardiovascular:  CAD (s/p CABG), CHF, HTN, MI, Hyperlipidemia


Pulmonary:  COPD, Other (lung CA s/p chemo and radiation)


CENTRAL NERVOUS SYSTEM:  Other (no pertinent hx)


GI:  GERD


Psych:  Anxiety, Depression


Musculoskeletal:  Osteoarthritis


Infectious disease:  No pertinent hx


ENT:  No pertinent hx


Renal/:  No pertinent hx


Endocrine:  No pertinent hx


Dermatology:  No pertinent hx





PAST SURGICAL HISTORY


Past Surgical History:  CABG (1996), Total hip replacement (left ), Total knee 

replacement (left ), Tonsillectomy





FAMILY HISTORY


Family History:  High Cholestrol, Hypertension





SOCIAL HISTORY


Smoke:  <1 pack per day


ALCOHOL:  none


Drugs:  None


Lives:  Alone (caregiver)





CURRENT MEDICATIONS


CURRENT MEDICATIONS





Current Medications








 Medications


  (Trade)  Dose


 Ordered  Sig/Everett


 Route


 PRN Reason  Start Time


 Stop Time Status Last Admin


Dose Admin


 


 Atorvastatin


 Calcium


  (Lipitor)  40 mg  HS


 PO


   9/30/18 21:00


    9/30/18 21:15





 


 Carisoprodol


  (Soma)  350 mg  TID


 PO


   9/30/18 14:00


    10/1/18 09:12





 


 Methylprednisolone


 Sodium Succinate


  (SOLU-Medrol


 40MG VIAL)  40 mg  Q8HRS


 IV


   9/30/18 14:00


    10/1/18 06:00





 


 Sodium Chloride  1,000 ml @ 


 75 mls/hr  [id number]


 IV


   9/30/18 12:30


 10/1/18 14:00  10/1/18 01:50





 


 Ceftriaxone Sodium


  (Rocephin)  1 gm  Q24H


 IVP


   9/30/18 13:00


    9/30/18 14:41





 


 Lactobacillus


 Rhamnosus


  (Culturelle)  1 cap  BID


 PO


   9/30/18 13:00


    10/1/18 09:12














ALLERGIES


ALLERGIES:  


Coded Allergies:  


     tramadol (Verified  Allergy, Severe, Seizures, 3/27/17)


 


 Tolerates morphine, hydromorphone, and oxycodone


     Sulfa (Sulfonamide Antibiotics) (Verified  Allergy, Intermediate, Itching 

& GI upset, 3/27/17)


     lorazepam (Verified  Allergy, Intermediate, Hives, 3/27/17)


     propoxyphene napsylate (Verified  Allergy, Intermediate, Hives & GI upset, 

3/27/17)


     I S O L A T I O N *CONTACT* (Verified  Allergy, Unknown, 3/27/17)


 


 ESBL +


     morphine (Verified  Adverse Reaction, Severe, 3/27/17)


 


 Seizures


     fluticasone propionate (Verified  Adverse Reaction, Intermediate, Anxious, 

3/27/17)


     ibuprofen (Verified  Adverse Reaction, Intermediate, NAUSEA/VOMITING, 3/27/

17)


     salmeterol xinafoate (Verified  Adverse Reaction, Intermediate, Anxious, 3/

27/17)





ROS


Review of System


14 point ROS conducted with pertinent positives noted above in HPI.





PHYSICAL EXAM


General:  Alert, Oriented X3, Cooperative, No acute distress


HEENT:  Atraumatic, Mucous membr. moist/pink


Lungs:  Other (expiratory wheezes throughout)


Heart:  Regular rate, Normal S1, Normal S2, Other (2/6 systolic murmur)


Abdomen:  Soft, No tenderness, Other (obese)


Extremities:  Normal pulses, Other (trace bilateral LE edema)


Skin:  No significant lesion


Neuro:  Normal speech, Sensation intact


Psych/Mental Status:  Mental status NL, Mood NL


MUSCULOSKELETAL:  No deformity





VITALS


VITALS





Vital Signs








  Date Time  Temp Pulse Resp B/P (MAP) Pulse Ox O2 Delivery O2 Flow Rate FiO2


 


10/1/18 11:00 98.0 82 20 114/51 (72) 96 Nasal Cannula 2.0 





 98.0       











LABS


Lab:





Laboratory Tests








Test


 10/1/18


03:55


 


White Blood Count


 7.2 x10^3/uL


(4.0-11.0)


 


Red Blood Count


 3.60 x10^6/uL


(3.50-5.40)


 


Hemoglobin


 8.8 g/dL


(12.0-15.5)


 


Hematocrit


 28.2 %


(36.0-47.0)


 


Mean Corpuscular Volume 78 fL () 


 


Mean Corpuscular Hemoglobin 24 pg (25-35) 


 


Mean Corpuscular Hemoglobin


Concent 31 g/dL


(31-37)


 


Red Cell Distribution Width


 17.0 %


(11.5-14.5)


 


Platelet Count


 298 x10^3/uL


(140-400)


 


Neutrophils (%) (Auto) 94 % (31-73) 


 


Lymphocytes (%) (Auto) 4 % (24-48) 


 


Monocytes (%) (Auto) 1 % (0-9) 


 


Eosinophils (%) (Auto) 0 % (0-3) 


 


Basophils (%) (Auto) 0 % (0-3) 


 


Neutrophils # (Auto)


 6.8 x10^3uL


(1.8-7.7)


 


Lymphocytes # (Auto)


 0.3 x10^3/uL


(1.0-4.8)


 


Monocytes # (Auto)


 0.1 x10^3/uL


(0.0-1.1)


 


Eosinophils # (Auto)


 0.0 x10^3/uL


(0.0-0.7)


 


Basophils # (Auto)


 0.0 x10^3/uL


(0.0-0.2)


 


Segmented Neutrophils % 94 % (35-66) 


 


Band Neutrophils % 1 % (0-9) 


 


Lymphocytes % 4 % (24-48) 


 


Monocytes % 1 % (0-10) 


 


Platelet Estimate


 Adequate


(ADEQUATE)


 


Hypochromasia Present 


 


Sodium Level


 143 mmol/L


(136-145)


 


Potassium Level


 4.5 mmol/L


(3.5-5.1)


 


Chloride Level


 105 mmol/L


()


 


Carbon Dioxide Level


 26 mmol/L


(21-32)


 


Anion Gap 12 (6-14) 


 


Blood Urea Nitrogen


 17 mg/dL


(7-20)


 


Creatinine


 0.9 mg/dL


(0.6-1.0)


 


Estimated GFR


(Cockcroft-Gault) 63.7 





 


Glucose Level


 195 mg/dL


(70-99)


 


Calcium Level


 8.2 mg/dL


(8.5-10.1)











ECHOCARDIOGRAM


ECHOCARDIOGRAM


<Conclusion>


The left ventricle is normal size.


Left ventricular systolic function is normal. The Ejection Fraction is 55-60%.


There is normal left ventricular wall thickness.


There is no significant aortic valvular stenosis.


There is mild aortic regurgitation on a technically difficult study.


There is no significant mitral valve regurgitation.


There is no mitral valve stenosis.


There is trace tricuspid valve regurgitation.


There is no significant pulmonic valvular regurgitation.


There is no pericardial effusion.





DATE:     01/07/14 1037





STRESS TEST


STRESS TEST


Conclusion


1. Moderate in size and moderate in intensity reversible apical anterior defect 

consistent with ischemia.  No evidence of previous infarction appreciated.


2. Ejection fraction calcualted to be 76% with normal wall motion on gated 

SPECT images.


3. Abnormal nuclear imaging scan.


4. Findings consistent with an intermediate risk scan.





DATE:     01/07/14 1135





HEART CATH


HEART CATH


Conclusion


Left main: 20% lesion.


LAD: mid 50% lesion.


LCX: mid occlusion.


RCA: proximal 25%.


LIMA patent to a small LAD.


SCG patent in skip fashion to the D1, OM2 and OM3.


LV gram: normal LV systolic function.


Continue medical treatment. Discussed with the patient.





DATE:     01/09/14 1232





ASSESSMENT/PLAN


ASSESSMENT/PLAN


1.  Mild acute on chronic probable diastolic HF; NT Pro BNP elevated. CXR 

without significant vascular congestion. no aggressive diuresis warranted at 

this time. 


2.  Acute on chronic hypercapnic respiratory failure; CXR with infiltrates. IV 

antibiotics as per PCP


3.  AE COPD; improved.


4.  CAD s/p CABG; clinically stable


5.  HTN; labile. monitor to assess need for therapy titratio


6.  Lung CA s/p radiation and chemo


7.  Tobaccoism; discussed/encouraged cessation








Recommendations





Check echo to assess LV function/presence of WMA


Continue statin, check lipids. 


Add ASA


Hold BB if wheezing persists.


Consider ischemic evaluation when acute pulmonary issues resolve, possible as 

an outpatient.


Supportive care.





JEFF HO MD 10/1/18 2129:


CARDIAC CONSULT


ASSESSMENT/PLAN


ASSESSMENT/PLAN


Patient seen and examined.  Agree with NP's assessment and plan.


Ac resp failure probably from AECOPD


BNP elevated but patient does not have clinical evidence for CHF


Chronic diastolic HF appears compensated


CAD clinically stable


2D echo showed normal LV function


We will consider ischemic workup as outpatient


Thank you for your consultation











JUAN HUMMEL Oct 1, 2018 11:46


JEFF HO MD Oct 1, 2018 21:29

## 2018-10-02 NOTE — CONS
DATE OF CONSULTATION:  



PULMONARY CONSULTATION



ATTENDING PHYSICIAN:  Dr. Junior.



REASON FOR CONSULTATION:  Dyspnea.



HISTORY OF PRESENT ILLNESS:  The patient is a 63-year-old female who has a

history of chronic COPD.  She is not on home oxygen.  She also has a history of

lung cancer, which was treated with chemo and radiation less than 5 years ago.



The patient presented to the hospital with 4-5 day history of shortness of

breath.  She also had a cough with green to dark brown sputum production.  No

fever, no chills, no headaches, no chest pains.  No nausea or vomiting, no

diarrhea, no dysuria, no focal weakness, no increased lower extremity edema. 

Her chest x-ray was reviewed.  It showed cardiomegaly and prominent interstitial

markings.  As a result, I have been asked to see her for further evaluation.  I

have also reviewed her CT chest from January.  At that time, she had a right

hilar mass with some post-radiation changes.  The patient was noted to be

wheezing.



PAST MEDICAL HISTORY:  History of COPD, could be severe.  Smoked for about 50

years.  History of coronary artery disease, history of lung cancer status post

chemo and radiation, last treatment she received was 2 years ago.



PAST SURGICAL HISTORY:  Knee replacement and coronary artery bypass surgery.



ALLERGIES:  SULFA, FLUTICASONE, IBUPROFEN, LORAZEPAM, MORPHINE, DARVOCET,

SALMETEROL.



MEDICATIONS:  All reviewed as listed in the MRAD.



REVIEW OF SYSTEMS:  Twelve-point systems obtained.  Pertinent positives

discussed in my history of present illness, otherwise noncontributory.  All

systems that were negative were reviewed as well.



SOCIAL HISTORY:  Has been a smoker for 45+ years and still smokes occasionally.



FAMILY HISTORY:  Noncontributory to lungs.



PHYSICAL EXAMINATION:

VITAL SIGNS:  Reviewed.  Her blood pressure 146/48, afebrile, pulse ox 95% on 2

liters.

NECK:  Supple.

LUNGS:  With bilateral expiratory wheezing.

CARDIOVASCULAR:  Regular rate.

ABDOMEN:  Soft, obese.

EXTREMITIES:  With no pitting edema.



LABORATORY DATA:  Reviewed.  White cell count 7.2, hemoglobin 8.8 and platelets

are 298.  ABGs with a pH of 7.39, pCO2 of 51, pO2 of 87 on 28% FiO2.



IMPRESSION:

1.  Acute hypoxic respiratory failure secondary to acute exacerbation of chronic

obstructive pulmonary disease and possible interstitial pneumonia.

2.  Abnormal chest x-ray with prominent interstitial markings.  The patietn's

clinical findings are not suggestive for congestive heart failure.  Could be

interstitial pneumonia.  We will obtain noncontrast CT chest for further

evaluation.

3.  History of lung cancer, status post chemo and radiation.  Last CT chest was

in January of this year.  At that time, she had a right hilar mass with

post-radiation changes.  We will obtain a followup CT chest.

4.  Morbid obesity with a BMI of 46.

5.  Compensated respiratory acidosis.



RECOMMENDATIONS:

1.  Continue with present oxygen with gradual weaning to keep saturation 92% and

above.

2.  DuoNebs to continue q.i.d.

3.  IV Solu-Medrol.

4.  Empiric antibiotic.

5.  Noncontrast CT chest to assess for lung cancer as well as rule out

interstitial pneumonia.

6.  DVT prophylaxis with Lovenox.

7.  Discussed with RN and we will follow along with you.

 



______________________________

JARED LAM MD



DR:  WANDER/rosalind  JOB#:  [id number] / [id number]

DD:  10/02/2018 14:46  DT:  10/02/2018 15:32

## 2018-10-02 NOTE — PDOC
PROGRESS NOTES


Subjective


Subjective


still wheezing





Objective


Objective





Vital Signs








  Date Time  Temp Pulse Resp B/P (MAP) Pulse Ox O2 Delivery O2 Flow Rate FiO2


 


10/2/18 09:08  83  144/67    


 


10/2/18 07:00 97.6  18  96 Nasal Cannula 2.0 





 97.6       














Intake and Output 


 


 10/2/18





 07:00


 


Intake Total 1900 ml


 


Output Total 1200 ml


 


Balance 700 ml


 


 


 


Intake Oral 1900 ml


 


Output Urine Total 1200 ml


 


# Voids 2











Physical Exam


Abdomen:  Soft, No tenderness, Other (obese)


Heart:  Regular rate, Normal S1, Normal S2, Other (2/6 systolic murmur)


Extremities:  Normal pulses, Other (trace bilateral LE edema)


General:  Alert, Oriented X3, Cooperative, No acute distress


HEENT:  Atraumatic, Mucous membr. moist/pink


Lungs:  Other (expiratory wheezes throughout)


MUSCULOSKELETAL:  No deformity


Neck:  No JVD


Neuro:  Normal speech, Sensation intact


Psych/Mental Status:  Mental status NL, Mood NL


Skin:  No significant lesion





Diagnosis


Problem List


Problems


Medical Problems:


(1) CHF exacerbation


Status: Acute  





(2) COPD with exacerbation


Status: Acute  











Assessment


Assessment


Problems


Medical Problems:


(1) CHF exacerbation


Status: Acute  





(2) COPD with exacerbation


Status: Acute  








FINAL IMPRESSION:


1.  Chronic obstructive pulmonary disease with acute exacerbation.


2.  Chronic hypercapnic respiratory failure, stable.


3.  Coronary artery disease, history of bypass surgery.


4.  History of lung cancer, had radiation treatment.


5.  Chronic smoker.


6.  Hypertension.


7.  Hyperlipidemia.


8.  Anxiety, depression.


9. Mone BNP, ?chf





PLAN: no clinical evidence of chf


ECHO good LVF


cardiology consult appreciated


iv steroids


duoneb.


iv rocephin.


pt/ot -pt refusing SNU


pul consult





 At this time, admit to hospital, hydrate with IV fluids.  Sputum


cultures.  Start on Rocephin, IV Solu-Medrol, DuoNeb 4 times daily.  DVT


prevention with Lovenox and see how the patient's condition improves.





Plan


Plan of Care


Problems


Medical Problems:


(1) CHF exacerbation


Status: Acute  





(2) COPD with exacerbation


Status: Acute  








Comment


Review of Relevant


I have reviewed the following items tonia (where applicable) has been applied.


Medications





Current Medications


Albuterol/ Ipratropium (Duoneb) 3 ml RTQID NEB  Last administered on 10/2/18at 

06:11;  Start 10/1/18 at 16:15


Aspirin (Ecotrin) 81 mg DAILYWBKFT PO  Last administered on 10/2/18at 09:07;  

Start 10/1/18 at 14:30


Bismuth Subsalicylate (Pepto-Bismol) 262 mg QIDACHS  PRN PO DYSPEPSIA;  Start 10

/1/18 at 09:45


Vitals/I & O





Vital Sign - Last 24 Hours








 10/1/18 10/1/18 10/1/18 10/1/18





 11:00 16:40 19:40 20:00


 


Temp 98.0  98.1 





 98.0  98.1 


 


Pulse 82  83 


 


Resp 20  18 


 


B/P (MAP) 114/51 (72)  147/63 (91) 


 


Pulse Ox 96 97 97 


 


O2 Delivery Nasal Cannula Nasal Cannula Nasal Cannula Nasal Cannula


 


O2 Flow Rate 2.0 2.0 2.0 2.0


 


    





    





 10/1/18 10/1/18 10/1/18 10/1/18





 21:09 21:30 22:09 23:13


 


Temp    97.9





    97.9


 


Pulse    88


 


Resp   18 16


 


B/P (MAP)    125/59 (81)


 


Pulse Ox 97 95 94 94


 


O2 Delivery Nasal Cannula Nasal Cannula Nasal Cannula Nasal Cannula


 


O2 Flow Rate 2.0 2.0  2.0


 


    





    





 10/2/18 10/2/18 10/2/18 10/2/18





 03:46 06:10 07:00 09:08


 


Temp 98.4  97.6 





 98.4  97.6 


 


Pulse 85  83 83


 


Resp 18  18 


 


B/P (MAP) 125/46 (72)  144/67 (92) 144/67


 


Pulse Ox 97  96 


 


O2 Delivery Nasal Cannula Nasal Cannula Nasal Cannula 


 


O2 Flow Rate 2.0 2.0 2.0 














Intake and Output   


 


 10/1/18 10/1/18 10/2/18





 15:00 23:00 07:00


 


Intake Total  800 ml 1100 ml


 


Output Total   1200 ml


 


Balance  800 ml -100 ml

















ALBAN WOODS MD Oct 2, 2018 09:25

## 2018-10-03 NOTE — PDOC
PROGRESS NOTES


Subjective


Subjective


doing well





Objective


Objective





Vital Signs








  Date Time  Temp Pulse Resp B/P (MAP) Pulse Ox O2 Delivery O2 Flow Rate FiO2


 


10/3/18 07:09     95 Nasal Cannula 2.0 


 


10/3/18 07:00 97.7 76 16 146/59 (88)    





 97.7       














Intake and Output 


 


 10/3/18





 07:00


 


Intake Total 1100 ml


 


Output Total 725 ml


 


Balance 375 ml


 


 


 


Intake Oral 1100 ml


 


Output Urine Total 725 ml


 


# Voids 5











Physical Exam


Abdomen:  Soft, No tenderness, Other (obese)


Heart:  Regular rate, Normal S1, Normal S2, Other (2/6 systolic murmur)


Extremities:  Normal pulses, Other (trace bilateral LE edema)


General:  Alert, Oriented X3, Cooperative, No acute distress


HEENT:  Atraumatic, Mucous membr. moist/pink


Lungs:  Other (clear lungs)


MUSCULOSKELETAL:  No deformity


Neck:  No JVD


Neuro:  Normal speech, Sensation intact


Psych/Mental Status:  Mental status NL, Mood NL


Skin:  No significant lesion





Diagnosis


Problem List


Problems


Medical Problems:


(1) CHF exacerbation


Status: Acute  





(2) COPD with exacerbation


Status: Acute  











Assessment


Assessment


Problems


Medical Problems:


(1) CHF exacerbation


Status: Acute  





(2) COPD with exacerbation


Status: Acute  








FINAL IMPRESSION:


1.  Chronic obstructive pulmonary disease with acute exacerbation.


2.  Chronic hypercapnic respiratory failure, stable.


3.  Coronary artery disease, history of bypass surgery.


4.  History of lung cancer, had radiation treatment.


5.  Chronic smoker.


6.  Hypertension.


7.  Hyperlipidemia.


8.  Anxiety, depression.


9. Mone BNP, ?chf





PLAN: d/c home today.


po prednisone+doxycycline


ct chest-no lung infiltrates, scar from prior lung cancer 


no clinical evidence of chf


ECHO good LVF


cardiology consult appreciated


iv steroids


duoneb.


pt/ot -pt refusing SNU


pul consult appreciated





Plan


Plan of Care


Problems


Medical Problems:


(1) CHF exacerbation


Status: Acute  





(2) COPD with exacerbation


Status: Acute  








Comment


Review of Relevant


I have reviewed the following items tonia (where applicable) has been applied.


Labs





Microbiology


9/30/18 - Final, Resulted


          


9/30/18 - Final, Resulted


          


9/30/18 - Final, Resulted


          


9/30/18 - Final, Resulted


          


9/30/18 - Final, Resulted


          


9/30/18 Gram Stain Evaluation - Final, Resulted


          


9/30/18 Sputum Culture, Resulted


          Pending


Medications





Current Medications


Fluticasone Propionate (Flonase) 2 spray DAILY NS ;  Start 10/3/18 at 09:00


Oxycodone HCl (Roxicodone) 15 mg PRN Q6HRS  PRN PO SEVERE PAIN Last 

administered on 10/3/18at 00:02;  Start 10/2/18 at 15:30


Vitals/I & O





Vital Sign - Last 24 Hours








 10/2/18 10/2/18 10/2/18 10/2/18





 10:41 11:16 12:00 14:44


 


Temp 97.7   97.8





 97.7   97.8


 


Pulse 86   83


 


Resp 20   22


 


B/P (MAP) 132/77 (95)   146/48 (80)


 


Pulse Ox 96 96 95 95


 


O2 Delivery Nasal Cannula Nasal Cannula Nasal Cannula Room Air


 


O2 Flow Rate 2.0 2.0 2.0 


 


    





    





 10/2/18 10/2/18 10/2/18 10/2/18





 16:32 17:57 19:27 19:36


 


Temp    98.4





    98.4


 


Pulse    87


 


Resp    20


 


B/P (MAP)    155/63 (93)


 


Pulse Ox  95 95 94


 


O2 Delivery Nasal Cannula Nasal Cannula  Room Air


 


O2 Flow Rate 2.0 2.0 2.0 


 


    





    





 10/2/18 10/2/18 10/2/18 10/3/18





 20:05 20:29 23:02 01:12


 


Temp   97.5 





   97.5 


 


Pulse   92 


 


Resp   20 


 


B/P (MAP)   158/75 (102) 


 


Pulse Ox  95 96 


 


O2 Delivery Room Air Room Air Room Air Room Air


 


    





    





 10/3/18 10/3/18 10/3/18 





 03:35 07:00 07:09 


 


Temp 98.5 97.7  





 98.5 97.7  


 


Pulse 85 76  


 


Resp 20 16  


 


B/P (MAP) 166/89 (114) 146/59 (88)  


 


Pulse Ox 94 98 95 


 


O2 Delivery Room Air Room Air Nasal Cannula 


 


O2 Flow Rate   2.0 














Intake and Output   


 


 10/2/18 10/2/18 10/3/18





 15:00 23:00 07:00


 


Intake Total  800 ml 300 ml


 


Output Total  725 ml 


 


Balance  75 ml 300 ml

















ALBAN WOODS MD Oct 3, 2018 09:56

## 2018-10-03 NOTE — PDOC
PULMONARY PROGRESS NOTES


Subjective


no soa


Vitals





Vital Signs








  Date Time  Temp Pulse Resp B/P (MAP) Pulse Ox O2 Delivery O2 Flow Rate FiO2


 


10/3/18 11:04      Nasal Cannula 2.0 


 


10/3/18 11:00 96.8 82 20 131/63 (85) 93   





 96.8       








General:  Alert, No acute distress


HEENT:  Other


Lungs:  Clear


Cardiovascular:  S1, S2


Abdomen:  Soft, Non-tender


Neuro Exam:  Alert


Extremities:  Other (trace edema)


Medications





Active Scripts








 Medications  Dose


 Route/Sig


 Max Daily Dose Days Date Category Dose


Instructions


 


 Doxepin Hcl


 Unknown Strength


 Capsule  Unknown Dose


 PO QHS


   9/30/18 Reported 


 


 Mirtazapine


 Unknown Strength


 Tablet  Unknown Dose


 PO QHS


   9/30/18 Reported 


 


 Xanax


  (Alprazolam) 0.5


 Mg Tablet  0.5 Mg


 PO PRN Q6HRS PRN


   9/30/18 Reported 


 


 [vitamin b12]    


 


   9/30/18 Reported 


 


 Zofran Odt


  (Ondansetron) 4


 Mg Tab.rapdis  4 Mg


 PO BID PRN


   4/1/18 Rx 


 


 Albuterol Sulfate


 Conc Neb Soln


  (Albuterol


 Sulfate) 2.5


 Mg/0.5 Ml Vial.neb  1 Vial


 NEB BID PRN


   5/19/17 Reported 


 


 Multi-Day


 Vitamins


  (Multivitamin) 1


 Each Tablet  1 Tab


 PO DAILY


   5/19/17 Reported 


 


 [potassium]    1 Tab


 PO DAILY


   5/19/17 Reported 


 


 [Promethazine


 Hcl/Codeine] 5 ML


 Syrup  5 Ml


 PO PRN Q6HRS PRN


  10 3/18/17 Rx 


 


 Metoprolol


 Succinate ( Xl )


  (Metoprolol


 Succinate) 25 Mg


 Tab.er.24h  25 Mg


 PO DAILY


   11/25/15 Reported  LAST DOSE GIVEN:


 This morning


 


 NEXT DOSE DUE:


 Tomorrow morning


 TIME:9:00 a.m.


 


 Symbicort 160-4.5


 Mcg Inhaler


  (Budesonide/Formoterol


 Fumarate) 10.2 Gm


 Hfa.aer.ad  2 Puff


 IH PRN BID


   7/15/14 Reported  Not given here-take as normally scheduled


 DATE:12-17-15


 TIME:9:00 a.m.


 NEXT DOSE DUE:


 DATE:12-17-15


 TIME:9:00 p.m.


 


 Zetia (Ezetimibe)


 10 Mg Tablet  10 Mg


 PO DAILY


   2/11/14 Reported  LAST DOSE GIVEN:


 This morning


 


 NEXT DOSE DUE:


 Tomorrow morning


 TIME:9:00 p.m.


 


 Oxycodone Hcl 15


 Mg Tablet  15 Mg


 PO TID PRN PRN


   1/6/14 Reported  Not given here


 Take again when needed


 TIME:1:00 p.m.


 NEXT DOSE DUE:


 DATE:12-18-15


 TIME:5:00 p.m.


 


 Omeprazole 20 Mg


 Tablet.dr  20 Mg


 PO DAILYAC


   1/6/14 Reported  LAST DOSE GIVEN:


 This morning


 


 NEXT DOSE DUE:


 Tomorrow morning


 TIME:7:30 a.m.


 


 Fluoxetine Hcl 40


 Mg Capsule  40 Mg


 PO DAILY08


   1/6/14 Reported  LAST DOSE GIVEN:


 This morning


 


 NEXT DOSE DUE:


 Tomorrow morning


 TIME:9:00 a.m.


 


 Carisoprodol 350


 Mg Tablet  350 Mg


 PO TID


   1/6/14 Reported  LAST DOSE GIVEN:


 this morning


 


 NEXT DOSE DUE:


 this afternoon


 TIME:9:00 p.m.


 


 Atorvastatin


 Calcium 40 Mg


 Tablet  40 Mg


 PO HS


   1/6/14 Reported  LAST DOSE GIVEN:


 last night


 


 NEXT DOSE DUE:


 take again tonight


 TIME:9:00 p.m.








Comments


CT CHEST


1. Spiculated right hilar mass similar to that seen on 1/5/2018, 


compatible with a treated lung malignancy.


2. Small right pleural effusion which has increased slightly in size.


3. A trace amount of left-sided pleural fluid has developed.


4. Emphysema with parenchymal scarring.


5. New minimal nonspecific peripheral right middle lobe groundglass 


opacity.





Impression


.


1.  Acute hypoxic respiratory failure secondary to acute exacerbation of chronic


obstructive pulmonary disease and acute bronchitis


2.  Abnormal chest x-ray with prominent interstitial markings.  The patients


clinical findings are not suggestive for congestive heart failure. 


3.  History of lung cancer, status post chemo and radiation.  Last CT chest was


in January of this year.  At that time, she had a right hilar mass with


post-radiation changes.  


4.  Morbid obesity with a BMI of 46.


5.  Compensated respiratory acidosis.





Plan


.





1.  Continue with present oxygen with gradual weaning to keep saturation 92% and


above.


2.  DuoNebs to continue q.i.d.


3.  IV Solu-Medrol. CHANGE TO PO STEROID


4.  Empiric antibiotic.


5.  Noncontrast CT chest reviewed. no major change. unchanged right hilar mass/ 

small effusion, tiny GG opacity, likely inflammatory. repeat ct chest per 

oncology in future


6.  DVT prophylaxis with Lovenox.


7.  Discussed with RN and we will follow along with you.











JARED LAM MD Oct 3, 2018 12:45

## 2018-10-29 NOTE — EKG
[facility]

              [address] [place] [address]

Test Date:    2018-10-29               Test Time:    01:10:58

Pat Name:     WILEY FERNANDO         Department:   

Patient ID:   [id number]           Room:          

Gender:       F                        Technician:   

:          [id number]               Requested By: RADAMES VACA

Order Number: [id number]           Reading MD:   Vu Johnson MD

                                 Measurements

Intervals                              Axis          

Rate:         97                       P:            42

TN:           146                      QRS:          24

QRSD:         94                       T:            75

QT:           384                                    

QTc:          492                                    

                           Interpretive Statements

SINUS RHYTHM

NON-SPECIFIC ST/T CHANGES

Electronically Signed On 10- 11:50:48 CDT by Vu Johnson MD

## 2018-10-29 NOTE — PHYS DOC
Past Medical History


Past Medical History:  CAD, Cancer, MI


Additional Past Medical Histor:  CARDIAC ISSUES, RADIATION AND CHEMO FOR RIGHT 

LUNG CA, CHRONIC BACK PAIN


Past Surgical History:  Coronary Bypass Surgery, Knee Replacement


Alcohol Use:  None


Drug Use:  None





Adult General


HPI


HPI





Patient is a 63-year-old female who presents with complaint of cough and 

shortness of breath for the last 3 days. Patient indicates that symptoms have 

progressively been getting worse. She indicates that she has taken a few 

breathing treatments at home in symptoms just were getting any better so called 

EMS. EMS reports that upon their arrival patient's oxygen saturation was 95% on 

room air. They gave a total of 3 breathing treatments while in route and 

indicate that patient has had some improvement. Patient states that currently 

she is feeling quite a bit better. She does indicate that she has had a cough 

that has been productive of yellow to green sputum. She does not believe that 

she has been running a fever. Patient states his symptoms of shortness of 

breath are worsened with minimal exertion. She states that breathing treatments 

have been helping. She denies any chest pain. She does complain that lower legs 

have been swelling more than usual.





Review of Systems


Review of Systems





Constitutional: Denies fever or chills []


Respiratory: Complains of cough, shortness of breath and wheezing[]


Cardiovascular: No additional information not addressed in HPI []


Neurologic: Denies headache, focal weakness or sensory changes []





All other systems were reviewed and found to be within normal limits, except as 

documented in this note.





Current Medications


Current Medications





Current Medications








 Medications


  (Trade)  Dose


 Ordered  Sig/Everett  Start Time


 Stop Time Status Last Admin


Dose Admin


 


 Methylprednisolone


 Sodium Succinate


  (SOLU-Medrol


 125MG VIAL)  125 mg  1X  ONCE  10/29/18 01:30


 10/29/18 01:31 DC 10/29/18 01:39


125 MG


 


 Oxycodone/


 Acetaminophen


  (Percocet 5/325)  1 tab  1X  ONCE  10/29/18 03:00


 10/29/18 03:01 UNV  














Allergies


Allergies





Allergies








Coded Allergies Type Severity Reaction Last Updated Verified


 


  tramadol Allergy Severe Seizures 3/27/17 Yes


 


  Sulfa (Sulfonamide Antibiotics) Allergy Intermediate Itching & GI upset 3/27/

17 Yes


 


  lorazepam Allergy Intermediate Hives 3/27/17 Yes


 


  propoxyphene napsylate Allergy Intermediate Hives & GI upset 3/27/17 Yes


 


  I S O L A T I O N *CONTACT* Allergy Unknown  3/27/17 Yes


 


  morphine Adverse Reaction Severe  3/27/17 Yes


 


  fluticasone propionate Adverse Reaction Intermediate Anxious 3/27/17 Yes


 


  ibuprofen Adverse Reaction Intermediate NAUSEA/VOMITING 3/27/17 Yes


 


  salmeterol xinafoate Adverse Reaction Intermediate Anxious 3/27/17 Yes











Physical Exam


Physical Exam





Constitutional: Well developed, well nourished, no acute distress, non-toxic 

appearance. []


HENT: Normocephalic, atraumatic, bilateral external ears normal, oropharynx 

moist, no oral exudates, nose normal. []


Eyes: PERRLA, EOMI, conjunctiva normal, no discharge. [] 


Neck: Normal range of motion, no tenderness, supple, no stridor. [] 


Cardiovascular: Heart rate regular rhythm []


Lungs & Thorax: Mildly diminished breath sounds are noted bilaterally with 

respiratory and expiratory wheezes noted. []


Abdomen: Bowel sounds normal, soft. [] 


Skin: Warm, dry, no erythema, no rash. [] 


Extremities: No tenderness, no cyanosis, no clubbing, ROM intact, with mild 

pitting edema to lower extremities. [] 


Neurologic: Alert and oriented X 3, normal motor function, normal sensory 

function, no focal deficits noted. []





Current Patient Data


Vital Signs





 Vital Signs








  Date Time  Temp Pulse Resp B/P (MAP) Pulse Ox O2 Delivery O2 Flow Rate FiO2


 


10/29/18 00:46 97.9 99 28 132/74 (93) 99 Room Air  





 97.9       








Lab Values





 Laboratory Tests








Test


 10/29/18


01:30


 


White Blood Count


 5.5 x10^3/uL


(4.0-11.0)


 


Red Blood Count


 3.98 x10^6/uL


(3.50-5.40)


 


Hemoglobin


 9.0 g/dL


(12.0-15.5)  L


 


Hematocrit


 28.8 %


(36.0-47.0)  L


 


Mean Corpuscular Volume


 73 fL ()


L


 


Mean Corpuscular Hemoglobin


 23 pg (25-35)


L


 


Mean Corpuscular Hemoglobin


Concent 31 g/dL


(31-37)


 


Red Cell Distribution Width


 18.1 %


(11.5-14.5)  H


 


Platelet Count


 273 x10^3/uL


(140-400)


 


Neutrophils (%) (Auto) 72 % (31-73)  


 


Lymphocytes (%) (Auto) 18 % (24-48)  L


 


Monocytes (%) (Auto) 5 % (0-9)  


 


Eosinophils (%) (Auto) 4 % (0-3)  H


 


Basophils (%) (Auto) 1 % (0-3)  


 


Neutrophils # (Auto)


 4.0 x10^3uL


(1.8-7.7)


 


Lymphocytes # (Auto)


 1.0 x10^3/uL


(1.0-4.8)


 


Monocytes # (Auto)


 0.3 x10^3/uL


(0.0-1.1)


 


Eosinophils # (Auto)


 0.2 x10^3/uL


(0.0-0.7)


 


Basophils # (Auto)


 0.1 x10^3/uL


(0.0-0.2)


 


Sodium Level


 147 mmol/L


(136-145)  H


 


Potassium Level


 3.1 mmol/L


(3.5-5.1)  L


 


Chloride Level


 103 mmol/L


()


 


Carbon Dioxide Level


 36 mmol/L


(21-32)  H


 


Anion Gap 8 (6-14)  


 


Blood Urea Nitrogen


 12 mg/dL


(7-20)


 


Creatinine


 1.0 mg/dL


(0.6-1.0)


 


Estimated GFR


(Cockcroft-Gault) 56.4  





 


BUN/Creatinine Ratio 12 (6-20)  


 


Glucose Level


 101 mg/dL


(70-99)  H


 


Calcium Level


 8.6 mg/dL


(8.5-10.1)


 


Total Bilirubin


 0.3 mg/dL


(0.2-1.0)


 


Aspartate Amino Transferase


(AST) 14 U/L (15-37)


L


 


Alanine Aminotransferase (ALT)


 12 U/L (14-59)


L


 


Alkaline Phosphatase


 110 U/L


()


 


NT-Pro-B-Type Natriuretic


Peptide 2508 pg/mL


(0-124)  H


 


Total Protein


 7.0 g/dL


(6.4-8.2)


 


Albumin


 3.2 g/dL


(3.4-5.0)  L


 


Albumin/Globulin Ratio


 0.8 (1.0-1.7)


L





 Laboratory Tests


10/29/18 01:30








 Laboratory Tests


10/29/18 01:30











EKG


EKG


[]


Interpretation Time:


EKG demonstrates normal sinus rhythm with rate of 97.





Radiology/Procedures


Radiology/Procedures


[]


Impressions:


Chest x-ray reviewed and demonstrates findings suggestive of mild fluid 

overload.





Course & Med Decision Making


Course & Med Decision Making


Pertinent Labs and Imaging studies reviewed. (See chart for details)





[]





Dragon Disclaimer


Dragon Disclaimer


This electronic medical record was generated, in whole or in part, using a 

voice recognition dictation system.





Departure


Departure


Impression:  


 Primary Impression:  


 COPD with exacerbation


 Additional Impression:  


 Congestive heart failure


Disposition:  09 ADMITTED AS INPATIENT


Admitting Physician:  Alban Junior


Condition:  IMPROVED


Referrals:  


ALBAN JUNIOR MD (PCP)





Problem Qualifiers








 Additional Impression:  


 Congestive heart failure


 Heart failure type:  unspecified  Heart failure chronicity:  unspecified  

Qualified Codes:  I50.9 - Heart failure, unspecified








RADAMES VACA Jr. DO Oct 29, 2018 00:58

## 2018-10-29 NOTE — PDOC
Provider Note


Provider Note


H&P  dictated.


#[id number]











ALBAN WOODS MD Oct 29, 2018 09:24

## 2018-10-29 NOTE — HP
ADMIT DATE:  10/29/2018



LOCATION:  588



REASON FOR ADMISSION TO THE HOSPITAL:  COPD with acute exacerbation and also

mild diastolic heart failure.



HISTORY OF PRESENT ILLNESS:  The patient is a 63-year-old female with history of

COPD, lung cancer, has diastolic dysfunction.  Had echocardiogram, seen by

Cardiology last month.  She was having cough with wheezing and also shortness of

breath, came to the Emergency Room.  BNP was elevated.  The patient had

wheezing, did not improve with the breathing treatment, was given Solu-Medrol

and IV Lasix and her condition was improved, was admitted to the hospital.



PAST MEDICAL HISTORY:  She has history of coronary artery disease, bypass

surgery; lung cancer, had radiation treatment more than 5 years ago;

hypertension; hyperlipidemia; anxiety; diastolic heart failure.



PAST SURGICAL HISTORY:  Heart bypass surgery, knee replacement.



ALLERGIES:  TO SULFA, FLUTICASONE, IBUPROFEN, LORAZEPAM, MORPHINE, DARVOCET,

SALMETEROL XINAFOATE, TRAMADOL.



Radiation to the lung for lung cancer.



MEDICATIONS:  She is on albuterol, Xanax 0.5 q. 6h., atorvastatin 40 mg daily,

budesonide twice a day, Soma 350 mg 3 times a day, Zetia 10 mg daily, fluoxetine

40 mg daily, metoprolol 25 mg daily, vitamin daily, omeprazole 20 mg daily,

Zofran for nausea, oxycodone 15 mg 3 times daily, potassium 10 mEq daily,

Phenergan, mirtazapine, doxepin, home oxygen, DuoNeb 4 times daily.



PERSONAL HISTORY:  Smoked for 30 years.  She says she quit recently couple of

weeks ago.  Denies alcohol.  The patient has chronic pain medications.



FAMILY HISTORY:  Positive for heart disease, lung problems.



REVIEW OF SYMPTOMS:  Has cough, no sputum, has some swelling at the ankle,

wheezing.  Rest of the 14-system was reviewed.  The patient had flu and

pneumonia shot, up-to-date on that.



PHYSICAL EXAMINATION:

VITAL SIGNS:  Shows temperature 97, pulse 99, respirations 28, blood pressure

132/74, 99 on room air.

HEENT:  Head is atraumatic.  Pupils equal.  Oral cavity:  No congestion.

NECK:  Supple.  Thyroid not enlarged.  JVD not elevated.

CHEST:  Symmetrical, scar of heart surgery.

CARDIOVASCULAR:  S1, S2.

LUNGS:  Expiratory wheezing.  Few crackles at the bases, mostly posterior.

ABDOMEN:  Soft, bowel sounds present, no mass palpable.

EXTERNAL GENITALIA:  No Orellana.

RECTAL:  Deferred.

EXTREMITIES:  No calf tenderness.  1+ edema at the ankles and the legs.

NEUROLOGIC:  Moving all extremities.  No focal deficits noted.



LABORATORY DATA:  Shows a white count of 5.5, hemoglobin 10, platelets 273. 

Electrolytes show sodium 147, potassium 3.1, chloride 103, bicarbonate 36, BUN

12, creatinine 1.0.  Glucose 100.  BNP was more than 2500.  LFTs were normal. 

Chest x-ray showed mild CHF, tiny right effusion.  EKG done, report is pending.



FINAL IMPRESSION:

1.  Chronic obstructive pulmonary disease with acute exacerbation.

2.  Bronchitis.

3.  Diastolic heart failure, mild.

4.  History of lung cancer, had radiation treatment.

5.  Coronary artery disease, history of bypass surgery.

6.  Hypertension.

7.  Hyperlipidemia.

8.  Chronic narcotic medications.

9.  Ex-smoker.

10.  Hypokalemia



PLAN:  At this time, was admitted to the hospital.  IV Lasix twice a day,

Solu-Medrol 40 three times daily.  Replace potassium and see how the patient

improves.  DuoNeb 4 times daily.  The patient was in the hospital last month,

had echocardiogram and seen by Pulmonology, had a CT of the chest.  We would not

repeat those at this time.

 



______________________________

ALBAN WOODS MD



DR:  LINETTE/rosalind  JOB#:  [id number] / [id number]

DD:  10/29/2018 10:08  DT:  10/29/2018 12:12

## 2018-10-29 NOTE — RAD
CHEST PA   LATERAL

 

Technique:  PA and lateral views of the chest were obtained.

 

Clinical History: SHORT OF BREATH SINCE FRIDAY

 

Comparison: September 30, 2018.

 

Findings:

 

 

Median sternotomy wires are again seen.

 

The heart is mildly enlarged the pulmonary vessels appear normal. 

Reticular opacities of the lungs are seen previously likely chronic 

pulmonary fibrosis. There is blunting of the right costophrenic angle. Is 

mild patchy opacities in the left lung base.

 

Impression:

 

Findings suggest mild CHF and tiny right effusion.

 

Electronically signed by: Musa Arndt III, MD (10/29/2018 2:51 AM) 

[facility]

## 2018-10-30 NOTE — RAD
PA and lateral chest radiograph.

 

History:  Follow-up congestive heart failure.

 

Comparison:  October 29, 2018.

 

Findings: Cardiac silhouette is at the upper limits of normal for size.  

Median sternotomy wires are present.  No pneumothorax is seen.  There is 

evidence of small right pleural effusion.  No large consolidation is 

identified.  There is accentuation of interstitial markings, although this

is probably similar to previous study given differences in technical 

factors between the 2 studies.  Numerous surgical clips are seen in left 

upper quadrant.  Proximal left humerus demonstrates fixation hardware.

 

Impression: 

1.  Ongoing mild congestive heart failure.

 

Electronically signed by: Moises Ramirez MD (10/30/2018 4:57 PM) [place] [id number]

## 2018-10-30 NOTE — PDOC
PROGRESS NOTES


Subjective


Subjective


feels better today, dec wheezing





Objective


Objective





Vital Signs








  Date Time  Temp Pulse Resp B/P (MAP) Pulse Ox O2 Delivery O2 Flow Rate FiO2


 


10/30/18 09:07  79  127/60    


 


10/30/18 08:05     89 Room Air  


 


10/30/18 07:00 97.7  19     





 97.7       


 


10/29/18 10:15       2.0 














Intake and Output 


 


 10/30/18





 07:00


 


Intake Total 1158 ml


 


Balance 1158 ml


 


 


 


Intake Oral 1158 ml


 


# Voids 2











Physical Exam


Abdomen:  Normal bowel sounds, Soft


Heart:  Regular rate, Normal S1


Extremities:  No clubbing, No cyanosis


General:  Alert, Oriented X3


HEENT:  Atraumatic


Lungs:  Other (dec wheezing)


MUSCULOSKELETAL:  No deformity


Neck:  No JVD


Neuro:  Normal speech


Psych/Mental Status:  Mental status NL


Skin:  No breakdown





Diagnosis


Problem List


Problems


Medical Problems:


(1) Congestive heart failure


Status: Acute  





(2) COPD with exacerbation


Status: Acute  











Assessment


Assessment


Problems


Medical Problems:


(1) Congestive heart failure


Status: Acute  





(2) COPD with exacerbation


Status: Acute  





FINAL IMPRESSION:


1.  Chronic obstructive pulmonary disease with acute exacerbation.


2.  Bronchitis acute.


3.  Diastolic heart failure, mild.good LVF


4.  History of lung cancer, had radiation treatment.


5.  Coronary artery disease, history of bypass surgery.no ischemia on last 

stress test few months ago


6.  Hypertension.


7.  Hyperlipidemia.


8.  Chronic narcotic medications.


9.  Ex-smoker.


10.Hypokalemia





PLAN: 


clinically improving


iv lasix today


iv solumedrol today.


repeat cxr today.


pot normal today


pt/ot


d/c home tomorrow.?.





Plan


Plan of Care


Problems


Medical Problems:


(1) Congestive heart failure


Status: Acute  





(2) COPD with exacerbation


Status: Acute  








Comment


Review of Relevant


I have reviewed the following items tonia (where applicable) has been applied.


Labs





Laboratory Tests








Test


 10/30/18


06:58


 


White Blood Count


 10.6 x10^3/uL


(4.0-11.0)


 


Red Blood Count


 3.72 x10^6/uL


(3.50-5.40)


 


Hemoglobin


 8.3 g/dL


(12.0-15.5)


 


Hematocrit


 27.3 %


(36.0-47.0)


 


Mean Corpuscular Volume 74 fL () 


 


Mean Corpuscular Hemoglobin 22 pg (25-35) 


 


Mean Corpuscular Hemoglobin


Concent 30 g/dL


(31-37)


 


Red Cell Distribution Width


 17.9 %


(11.5-14.5)


 


Platelet Count


 304 x10^3/uL


(140-400)


 


Neutrophils (%) (Auto) 92 % (31-73) 


 


Lymphocytes (%) (Auto) 4 % (24-48) 


 


Monocytes (%) (Auto) 4 % (0-9) 


 


Eosinophils (%) (Auto) 0 % (0-3) 


 


Basophils (%) (Auto) 0 % (0-3) 


 


Neutrophils # (Auto)


 9.7 x10^3uL


(1.8-7.7)


 


Lymphocytes # (Auto)


 0.5 x10^3/uL


(1.0-4.8)


 


Monocytes # (Auto)


 0.4 x10^3/uL


(0.0-1.1)


 


Eosinophils # (Auto)


 0.0 x10^3/uL


(0.0-0.7)


 


Basophils # (Auto)


 0.0 x10^3/uL


(0.0-0.2)


 


Sodium Level


 141 mmol/L


(136-145)


 


Potassium Level


 4.2 mmol/L


(3.5-5.1)


 


Chloride Level


 103 mmol/L


()


 


Carbon Dioxide Level


 32 mmol/L


(21-32)


 


Anion Gap 6 (6-14) 


 


Blood Urea Nitrogen


 23 mg/dL


(7-20)


 


Creatinine


 0.9 mg/dL


(0.6-1.0)


 


Estimated GFR


(Cockcroft-Gault) 63.7 





 


Glucose Level


 135 mg/dL


(70-99)


 


Calcium Level


 8.8 mg/dL


(8.5-10.1)








Medications





Current Medications


Albuterol/ Ipratropium (Duoneb) 3 ml RTQID NEB  Last administered on 10/30/18at 

08:03;  Start 10/29/18 at 12:00


Amitriptyline HCl (Elavil) 50 mg QHS PO  Last administered on 10/29/18at 21:16;

  Start 10/29/18 at 21:00


Atorvastatin Calcium (Lipitor) 40 mg HS PO  Last administered on 10/29/18at 21:

16;  Start 10/29/18 at 21:00


Enoxaparin Sodium (Lovenox 40mg Syringe) 40 mg Q12HR SQ  Last administered on 10

/30/18at 09:07;  Start 10/29/18 at 21:00


Furosemide (Lasix) 20 mg BID92 IVP  Last administered on 10/30/18at 09:07;  

Start 10/29/18 at 10:00


Methylprednisolone Sodium Succinate (SOLU-Medrol 40MG VIAL) 40 mg Q8HRS IV  

Last administered on 10/30/18at 05:46;  Start 10/29/18 at 14:00


Potassium Chloride (Klor-Con) 20 meq DAILYWBKFT PO  Last administered on 10/30/

18at 09:06;  Start 10/30/18 at 08:00


Potassium Chloride (Klor-Con) 40 meq 1X  ONCE PO ;  Start 10/29/18 at 10:00;  

Stop 10/29/18 at 10:01;  Status DC


Vitals/I & O





Vital Sign - Last 24 Hours








 10/29/18 10/29/18 10/29/18 10/29/18





 09:55 10:15 11:00 13:03


 


Temp   98.2 





   98.2 


 


Pulse   84 


 


Resp   18 


 


B/P (MAP)   145/67 (93) 


 


Pulse Ox 91 90 90 90


 


O2 Delivery Room Air Nasal Cannula RA/ 2L PRN Room Air


 


O2 Flow Rate  2.0  


 


    





    





 10/29/18 10/29/18 10/29/18 10/29/18





 15:00 16:07 19:00 20:00


 


Temp 98.1  98.1 





 98.1  98.1 


 


Pulse 120  118 


 


Resp 18  22 


 


B/P (MAP) 116/66 (83)  148/75 (99) 


 


Pulse Ox 82 90 89 


 


O2 Delivery RA/ 2L PRN  Room Air Room Air Room Air


 


    





    





 10/29/18 10/29/18 10/29/18 10/30/18





 20:17 20:55 22:57 03:00


 


Temp   98.1 97.9





   98.1 97.9


 


Pulse   117 108


 


Resp   21 19


 


B/P (MAP)   106/57 (73) 116/60 (78)


 


Pulse Ox  96 91 92


 


O2 Delivery Room Air Room Air Room Air Room Air


 


    





    





 10/30/18 10/30/18 10/30/18 





 07:00 08:05 09:07 


 


Temp 97.7   





 97.7   


 


Pulse 79  79 


 


Resp 19   


 


B/P (MAP) 127/60 (82)  127/60 


 


Pulse Ox 83 89  


 


O2 Delivery Room Air Room Air  














Intake and Output   


 


 10/29/18 10/29/18 10/30/18





 15:00 23:00 07:00


 


Intake Total 600 ml 418 ml 140 ml


 


Balance 600 ml 418 ml 140 ml

















ALBAN WOODS MD Oct 30, 2018 09:54

## 2018-10-31 NOTE — PDOC
PROGRESS NOTES


Subjective


Subjective


pt does not feel well today, did not sleep well last night





Objective


Objective





Vital Signs








  Date Time  Temp Pulse Resp B/P (MAP) Pulse Ox O2 Delivery O2 Flow Rate FiO2


 


10/31/18 09:56      Room Air  


 


10/31/18 09:53  80  125/61    


 


10/31/18 07:22     94   


 


10/31/18 07:00 97.6  20     





 97.6       


 


10/30/18 22:02       2.0 














Intake and Output 


 


 10/31/18





 07:00


 


Intake Total 850 ml


 


Balance 850 ml


 


 


 


Intake Oral 850 ml


 


# Voids 1











Physical Exam


Abdomen:  Normal bowel sounds, Soft


Heart:  Regular rate, Normal S1


Extremities:  No clubbing, No cyanosis


General:  Alert, Oriented X3


HEENT:  Atraumatic


Lungs:  Other (dec wheezing)


MUSCULOSKELETAL:  No deformity


Neck:  No JVD


Neuro:  Normal speech


Psych/Mental Status:  Mental status NL


Skin:  No breakdown





Diagnosis


Problem List


Problems


Medical Problems:


(1) Congestive heart failure


Status: Acute  





(2) COPD with exacerbation


Status: Acute  











Assessment


Assessment


Problems


Medical Problems:


(1) Congestive heart failure


Status: Acute  





(2) COPD with exacerbation


Status: Acute  





FINAL IMPRESSION:


1.  Chronic obstructive pulmonary disease with acute exacerbation.


2.  Bronchitis acute.


3.  Diastolic heart failure, mild.good LVF


4.  History of lung cancer, had radiation treatment.


5.  Coronary artery disease, history of bypass surgery.no ischemia on last 

stress test few months ago


6.  Hypertension.


7.  Hyperlipidemia.


8.  Chronic narcotic medications.


9.  Ex-smoker.


10.Hypokalemia





PLAN: doxycycline po added


trazodone for sleep


clinically improving


po lasix today 80 mg


iv solumedrol today.


repeat cxr mild chf


pot normal today


pt/ot


d/c home tomorrow.?.





Plan


Plan of Care


Problems


Medical Problems:


(1) Congestive heart failure


Status: Acute  





(2) COPD with exacerbation


Status: Acute  








Comment


Review of Relevant


I have reviewed the following items tonia (where applicable) has been applied.


Medications





Current Medications


Furosemide (Lasix) 80 mg DAILY PO  Last administered on 10/31/18at 09:53;  

Start 10/31/18 at 09:00


Methylprednisolone Sodium Succinate (SOLU-Medrol 40MG VIAL) 40 mg Q12HR IV  

Last administered on 10/31/18at 09:54;  Start 10/30/18 at 21:00


Vitals/I & O





Vital Sign - Last 24 Hours








 10/30/18 10/30/18 10/30/18 10/30/18





 11:00 12:16 14:49 15:00


 


Temp 97.6   97.4





 97.6   97.4


 


Pulse 81   91


 


Resp 18  18 20


 


B/P (MAP) 125/60 (81)   93/60 (71)


 


Pulse Ox 89 88  88


 


O2 Delivery Room Air Room Air Room Air Room Air


 


    





    





 10/30/18 10/30/18 10/30/18 10/30/18





 16:53 17:45 19:00 20:00


 


Temp   97.8 





   97.8 


 


Pulse   81 


 


Resp   18 


 


B/P (MAP)   123/60 (81) 


 


Pulse Ox   94 


 


O2 Delivery Room Air Room Air Room Air Room Air


 


    





    





 10/30/18 10/30/18 10/30/18 10/31/18





 20:42 22:02 22:51 02:59


 


Temp   98.2 98.3





   98.2 98.3


 


Pulse   90 83


 


Resp   19 20


 


B/P (MAP)   112/61 (78) 143/62 (89)


 


Pulse Ox  94 97 92


 


O2 Delivery Room Air Room Air Room Air Room Air


 


O2 Flow Rate  2.0  


 


    





    





 10/31/18 10/31/18 10/31/18 10/31/18





 03:21 07:00 07:22 09:53


 


Temp  97.6  





  97.6  


 


Pulse  80  80


 


Resp  20  


 


B/P (MAP)  125/61 (82)  125/61


 


Pulse Ox 94 94 94 


 


O2 Delivery Room Air Room Air Room Air 


 


    





    





 10/31/18   





 09:56   


 


O2 Delivery Room Air   














Intake and Output   


 


 10/30/18 10/30/18 10/31/18





 15:00 23:00 07:00


 


Intake Total  850 ml 


 


Balance  850 ml 

















ALBAN WOODS MD Oct 31, 2018 10:10

## 2018-11-03 NOTE — PDOC
Provider Note


Provider Note


Discharge summary dictated.


#[id number]











ALBAN WOODS MD Nov 3, 2018 12:28

## 2018-11-03 NOTE — DS
DATE OF DISCHARGE:  11/01/2018



REASON FOR ADMISSION TO THE HOSPITAL:  Combination of COPD and CHF exacerbation.



CONSULTATIONS:  None.



PROCEDURE:  None.



HOSPITAL COURSE:  The patient is a 63-year-old female with history of coronary

artery disease, bypass surgery, chronic COPD on bronchodilators at home.  She

was found to have a new onset of diastolic heart failure, had echocardiogram,

seen by Cardiology and Pulmonology, last a couple of weeks ago.  She came with

COPD with acute exacerbation as well as shortness of breath.  BNP was slightly

elevated, bronchospasm, was given IV Solu-Medrol and IV Lasix.  Her CHF improved

with diuretics and COPD improved with IV antibiotics, and the patient was

discharged.  The patient had a complete workup done in last admission less than

a month ago.



FINAL DIAGNOSES:

1.  Chronic obstructive pulmonary disease with acute exacerbation.

2.  Chronic diastolic heart failure.

3.  Coronary artery disease, history of bypass surgery.

4.  Anemia of chronic disease, sees Hematology.

5.  Hypertension.

6.  Hyperlipidemia.

7.  History of smoking.  She stopped smoking.



DISPOSITION:  Home.  The patient is up-to-date on flu and pneumonia shots.



DISCHARGE MEDICATIONS:  See [facility] for discharge medications.



The patient had extensive workup less than a month ago including echo, CT scan

and a stress test not too long ago.

 



______________________________

ALBAN WOODS MD



DR:  LINETTE/rosalind  JOB#:  [id number] / [id number]

DD:  11/03/2018 12:27  DT:  11/03/2018 12:53

## 2019-01-03 NOTE — PHYS DOC
Past Medical History


Past Medical History:  CAD, Cancer, Hypertension, MI, Pneumonia


Additional Past Medical Histor:  CARDIAC ISSUES, RADIATION AND CHEMO FOR RIGHT 

LUNG CA, CHRONIC BACK PAIN


Past Surgical History:  Coronary Bypass Surgery, Knee Replacement


Alcohol Use:  None


Drug Use:  None





Adult General


Chief Complaint


Chief Complaint:  MECHANICAL FALL





HPI


HPI


Patient is a 63-year-old female who presents to the emergency department for 

evaluation, she is in the hospital because she is scheduled for a bone scan for 

monitoring of her  lung cancer, and she was getting lunch in the cafeteria. She 

states she was putting her tray on the counter by the , when she tripped 

over her feet, possibly tripping over a transition from the floor to the 

carpet. She denies feeling dizzy or lightheaded or any palpitations, chest pain

, or malaise prior to falling. She states she simply lost her balance and 

tripped. She did not hit her head. She sustained a superficial abrasion on her 

left elbow, and on her right thumb. She also sustained a contusion on her left 

anterior shin. She has some discomfort in this area, but denies any other pain. 

Her last tetanus was within the past 10 years. She has not had any fevers or 

chills, and denies any shortness of breath, cough, or any other painful areas. 

She denies any headache or neck pain, numbness or weakness. Palpation of the 

affected areas worsen her pain. There are no alleviating factors to her 

symptoms otherwise.





Review of Systems


Review of Systems





Constitutional: Denies fever or chills []


Eyes: Denies change in visual acuity, redness, or eye pain []


HENT: Denies nasal congestion or sore throat []


Respiratory: Denies cough or shortness of breath []


Cardiovascular:The patient denies any shortness of breath, chest pain, 

palpitations, or orthopnea []


GI: Denies abdominal pain, nausea, vomiting, bloody stools or diarrhea []


: Denies dysuria or hematuria []


Musculoskeletal: Denies back pain or joint pain . Pain in extremities as noted 

in the history of present illness.[]


Integument: Denies rash or skin lesions []


Neurologic: Denies headache, focal weakness or sensory changes []





Allergies


Allergies





Allergies








Coded Allergies Type Severity Reaction Last Updated Verified


 


  tramadol Allergy Severe Seizures 3/27/17 Yes


 


  Sulfa (Sulfonamide Antibiotics) Allergy Intermediate Itching & GI upset 3/27/

17 Yes


 


  lorazepam Allergy Intermediate Hives 3/27/17 Yes


 


  propoxyphene napsylate Allergy Intermediate Hives & GI upset 3/27/17 Yes


 


  morphine Adverse Reaction Severe  3/27/17 Yes


 


  ibuprofen Adverse Reaction Intermediate NAUSEA/VOMITING 3/27/17 Yes











Physical Exam


Physical Exam


PHYSICAL EXAM:





CONSTITUTIONAL: Well developed, well nourished


HEAD: normocephalic, atraumatic


EENT: PERRL, EOMI. Conjunctivae normal color, sclerae non-icteric; moist mucous 

membranes.


NECK: Supple, non-tender; no meningismus.There is full, painless range of 

motion of the cervical spine, without any focal bony midline tenderness to 

palpation.


LUNGS: There are mild scattered expiratory wheezes, breathing even and 

unlabored. Normal air movement.


HEART: Regular rate and rhythm, no murmur


CHEST: No deformity; non-tender


ABDOMEN: The abdomen is soft, and non-tender, no masses or bruits.


EXTREM: Normal ROM; no deformity, no calf tenderness. Normal pulses palpable in 

all extremities. There is no pedal edema. There is a superficial abrasion/skin 

tear on the extensor surface of the left elbow, without any underlying bony 

tenderness to palpation. There is a superficial abrasion on the dorsal aspect 

of the right thumb without bony tenderness to palpation. There is a contusion, 

without break in the skin, on the anterior aspect of the left shin. The area is 

tender to palpation but there is no crepitus.


SKIN: No rash; no diaphoresis


NEURO: Alert; normal speech and cognition; CN's grossly intact; strength 

grossly intact without focal deficit.


BACK: No CVA TTP.





Current Patient Data


Vital Signs





 Vital Signs








  Date Time  Temp Pulse Resp B/P (MAP) Pulse Ox O2 Delivery O2 Flow Rate FiO2


 


1/3/19 12:12 98.1 91 16 112/58 (76) 97 Room Air  





 98.1       











EKG


EKG


[]





Radiology/Procedures


Radiology/Procedures


[]





Course & Med Decision Making


Course & Med Decision Making


I recommended obtaining an x-ray, to  definitively exclude the possibility of 

an occult fracture of the left shin, although this is not clinically suspected. 

The patient declined the need for an x-ray at this time. She expressed 

understanding of the inability to definitively rule out an occult fracture. I 

discussed importance of close follow-up with her PCP and return precautions in 

detail.


The patient does ambulate independently without any difficulty.





Dragon Disclaimer


Dragon Disclaimer


This electronic medical record was generated, in whole or in part, using a 

voice recognition dictation system.





Departure


Departure


Impression:  


 Primary Impression:  


 Contusion


 Additional Impressions:  


 Abrasion


 Accidental fall


Disposition:  01 HOME, SELF-CARE


Condition:  STABLE


Referrals:  


ALBAN WOODS MD (PCP)





Problem Qualifiers











BERNARDO GIBBS MD Jonh 3, 2019 12:55

## 2019-01-03 NOTE — RAD
BONE SCAN WHOLE BODY

 

History: Small cell lung cancer, left hip replacement, general aches and 

pain

 

Comparison: There is no previous similar exam available, correlation made 

with CT chest and abdomen performed the same day

 

Findings:

Whole body bone scan was performed, patient injected with 25.5 mCi 

technetium 99m MDP. There is no radiotracer activity in a pattern 

suggestive of osseous metastatic disease. There are foci of radiotracer 

activity of the bilateral shoulders, knees, ankles and feet more likely 

degenerative in etiology.

 

Impression: 

 

1.  There is no radiotracer activity in a pattern suggestive of osseous 

metastatic disease, foci of radiotracer activity in a bilateral 

distribution likely related to degenerative change.

 

Electronically signed by: Mukesh Collazo MD (1/3/2019 4:51 PM) [facility]

## 2019-01-03 NOTE — RAD
CT chest and abdomen with contrast

 

Indication: Lung cancer

 

Technique: Postcontrast CT imaging was performed of the chest and abdomen,

multiplanar reconstruction images submitted. One or more of the following 

individualized dose reduction techniques were utilized for this 

examination:  1. Automated exposure control  2. Adjustment of the mA 

and/or kV according to patient size  3. Use of iterative reconstruction 

technique.

 

Comparison: Chest CT October 2018, no previous dedicated abdomen CT 

available

 

CHEST:

 

Findings:  

Previously seen small right pleural effusion has resolved. There is 

residual right perihilar soft tissue density mass extending to the right 

lower lobe and right middle lobe as well as right suprahilar region. This 

is somewhat difficult to compare with previous noncontrast exam although 

probably similar such as seen on axial images 27 series 2 versus previous 

image 29 series 3, maximal dimension estimated about 4.4 cm transverse by 

4.7 cm AP by about 5.9 cm CC. There is associated variable bronchial 

luminal narrowing as seen previously. There is no new significant 

pulmonary nodularity. There are some tiny left lower lobe nodule such as 

seen on axial images 37 and 44 overall unchanged. There is emphysema with 

upper zone predominance. There is no pericardial effusion or pneumothorax.

There is again prominent coronary calcification. There again has been a 

median sternotomy. Thoracic aortic caliber is within normal limits. No new

suspicious bone lesion is identified by CT.

 

IMPRESSION:

1.  There is residual right perihilar mass overall similar in extent, no 

new pulmonary nodularity. Previously seen small right pleural effusion has

resolved.

2. There is again prominent coronary calcification.

3. There is emphysema.

 

ABDOMEN: 

 

Findings: There are again multiple clips in the left upper quadrant of the

abdomen. There is no adrenal nodularity. Gallbladder is present without 

obvious intraluminal abnormality by CT. No new focal lesion is identified 

of the liver, pancreas, spleen. Both kidneys enhance, no hydronephrosis. 

There is atherosclerotic calcification of the abdominal aorta. Bowel is 

not dilated. There is no free air or free fluid. There is retained stool 

greater of the ascending and transverse colon.

 

IMPRESSION:

 

1. There is no evidence of metastatic disease to the abdomen.

 

Electronically signed by: Mukesh Collazo MD (1/3/2019 4:04 PM) [id number]

## 2019-03-08 NOTE — RAD
Left lower extremity venous insufficiency ultrasound exam, 3/8/2019:

 

HISTORY: Nonhealing leg wound

 

Duplex evaluation of the saphenous veins in the left lower extremity was 

performed including grayscale, color-flow and spectral Doppler analysis.

 

There is significant reflux in the left greater saphenous vein near the 

saphenofemoral junction. At this level the vein measures 8.5 mm and 

demonstrates a reflux time of 1.2 seconds.

 

An accessory anterior saphenous vein is noted in the left upper thigh. It 

does not demonstrate significant reflux.

 

There is a scar in the mid thigh reportedly related to vein graft harvest 

for CABG. The underlying saphenous vein cannot be visualized at this 

level. There is a 1 x 1.4 x 4.4 cm irregular hypoechoic area in the soft 

tissues at this level probably representing an old seroma or hematoma. 

Infection cannot be excluded.

 

The left greater saphenous vein in the distal thigh and lower leg are 

patent and do not demonstrate significant reflux. 2 small perforating 

veins are noted in the mid and upper calf. They do not demonstrate 

significant reflux.

 

The left lesser saphenous vein is patent and does not demonstrate 

significant reflux. There is mild subcutaneous edema in the lower leg.

 

 

IMPRESSION:

1. Evidence of previous greater saphenous vein harvest in the mid thigh 

with a probable small postoperative fluid collection as instructed above.

2. Reflux is identified at the left saphenofemoral junction.

3. No significant reflux is identified in the greater saphenous vein in 

the left lower leg or in the left lesser saphenous vein.

 

 

 

Electronically signed by: Rick Moritz, MD (3/8/2019 3:16 PM) [facility]

## 2019-03-08 NOTE — RAD
Left lower extremity arterial ultrasound, 3/8/2019:

 

HISTORY: Left lower extremity wound, smoking history

 

Duplex evaluation of the major arteries in the left lower extremity was 

performed including grayscale, color-flow and spectral Doppler analysis.

 

There are moderate scattered atherosclerotic plaques. The plaques are 

partially calcified. The left common femoral, superficial femoral and 

popliteal arteries demonstrate biphasic Doppler waveforms. There are 

relatively high velocities throughout these vessels. No significant focal 

velocity acceleration is seen to suggest high-grade focal stenosis.

 

Patent anterior tibial and peroneal arteries are present in the left lower

leg demonstrating biphasic Doppler waveforms. The left posterior tibial 

artery is patent demonstrating a monophasic Doppler waveform. The left 

dorsalis pedis artery is patent with a biphasic Doppler waveform.

 

 

IMPRESSION:

1. Moderate scattered atherosclerotic plaquing throughout the left lower 

extremity.

2. No duplex evidence of high-grade femoral-popliteal stenosis.

3. Three-vessel arterial runoff in the left lower leg with mild 

degradation of the left posterior tibial Doppler waveform.

 

Electronically signed by: Rick Moritz, MD (3/8/2019 4:18 PM) [facility]

## 2019-12-10 NOTE — RAD
DATE: 12/9/2019



EXAM: DIGITAL DIAGNOSTIC RT



HISTORY: Abnormal mammogram



COMPARISON: 11/13/2019 and 5/19/2017 mammographic exams



This study was interpreted with the benefit of Computerized Aided Detection

(CAD).





Breast Density: SCATTERED The breast parenchyma shows scattered fibroglandular

densities. Breast parenchyma level B.





FINDINGS: Coarse calcifications are noted on spot magnification imaging of the

right upper-outer breast. Small punctate calcifications also present. 





IMPRESSION: Calcifications which probably represent fat necrosis.







BI-RADS CATEGORY: 3 PROBABLY BENIGN FINDING(S)-SHORT INTERVAL FOLLOW-UP

SUGGESTED



RECOMMENDED FOLLOW-UP: 6M 6 MONTH FOLLOW-UP. Six-month follow-up mammographic

imaging of the right breast is recommended to assess stability of

calcifications.



PQRS compliance statement: Patient information was entered into a reminder

system with a target due date for the next mammogram.



Mammography is a sensitive method for finding small breast cancers, but it

does not detect them all and is not a substitute for careful clinical

examination.  A negative mammogram does not negate a clinically suspicious

finding and should not result in delay in biopsying a clinically suspicious

abnormality.



"Our facility is accredited by the American [employer] Mammography

Program."

## 2020-09-08 NOTE — RAD
DATE: 9/8/2020 12:15 PM



EXAM: MAMMO MELITON DIAG RT



HISTORY:  Short-term follow-up probably benign right breast calcifications



COMPARISON: 5/19/2017, 12/9/2019 and bilateral mammogram of 11/13/2019.



CC and MLO views of the right breast were performed. Tomosynthesis was also

performed in CC and MLO projections.



This study was interpreted with the benefit of Computerized Aided Detection

(CAD).





FINDINGS:



Breast Density: FATTY  The Breast Parenchyma is primarily fatty replaced.

Breast parenchyma level density A.



The cluster of calcifications in the upper outer right breast show further

coarsening in a pattern typical of maturing dystrophic calcifications.

No suspicious masses, microcalcifications or architectural distortion is

present to suggest malignancy.The visualized axilla is unremarkable. 



IMPRESSION: No mammographic evidence of malignancy. 



BI-RADS CATEGORY: 2 BENIGN FINDING(S)



RECOMMENDED FOLLOW-UP: 12M 12 MONTH FOLLOW-UP Annual screening mammography is

recommended, unless clinically indicated sooner based on symptoms or change in

physical exam. She will next be due for bilateral mammographic screening after

November 13, 2020..



PQRS compliance statement: Patient information was entered into a reminder

system with a target due date 11/14/2020 for the next mammogram.



Mammography is a sensitive method for finding small breast cancers, but it

does not detect them all and is not a substitute for careful clinical

examination.  A negative mammogram does not negate a clinically suspicious

finding and should not result in delay in biopsying a clinically suspicious

abnormality.



"Our facility is accredited by the American [employer] Mammography

Program."

## 2020-11-06 NOTE — RAD
EXAM: HAND LEFT 3V 11/6/2020 8:14 PM

 

CLINICAL INDICATION: Fourth and fifth digit pain after fall from standing.

 

COMPARISON:None

 

TECHNIQUE:3 views of the left hand

 

FINDINGS:No acute fracture. Alignment is normal. Joint spaces are 

maintained. No focal soft tissue abnormality.

 

IMPRESSION:No acute osseous abnormality.

 

Electronically signed by: Radha Doe MD (11/6/2020 9:26 PM) [id number]

## 2020-11-06 NOTE — ED.ADGEN
Past Medical History


Past Medical History:  CAD, Cancer, Hypertension, MI, Pneumonia


Additional Past Medical Histor:  CARDIAC ISSUES, RADIATION AND CHEMO FOR RIGHT 

LUNG CA, CHRONIC BACK PAIN


Past Surgical History:  Coronary Bypass Surgery, Knee Replacement, Other


Additional Past Surgical Histo:  LEFT SHOULDER REPLACEMENT


Smoking Status:  Current Some Day Smoker


Alcohol Use:  None


Drug Use:  None





General Adult


EDM:


Chief Complaint:  MECHANICAL FALL





HPI:


HPI:





Patient is a 65  year old female who presents to emergency room with complaints 

of right anterior knee pain and swelling and left fourth and fifth digit pain 

after tripping and falling at a gas station this evening.  Patient states that 

there was a rug in the gas station shooting caught her foot on and tripped.  She

denies any loss of consciousness, head, neck, or back pain.  Patient complains 

of skin tears to her right anterior knee and left fourth and fifth digits.  She 

states that she also fell 2 days ago at home and has a skin tear to her left 

elbow.  She currently denies any pain in her left elbow.  She denies any 

shortness of breath, abdominal pain, nausea, or vomiting since the fall.  She 

currently rates the pain a 9 right and left hand 8 out of 10 on the pain scale, 

she denies any alleviating factors, the pain is worse with movement and 

palpation.  Patient states her last tetanus shot was less than 5 years ago.





Review of Systems:


Review of Systems:


Complete ROS is negative unless otherwise noted in HPI.





Current Medications:





Current Medications








 Medications


  (Trade)  Dose


 Ordered  Sig/[facility]  Start Time


 Stop Time Status Last Admin


Dose Admin


 


 Lidocaine/


 Epinephrine


  (LIDOCAINE


 1%-EPI 1:100,000


 Multi-Dose)  20 ml  1X  ONCE  20 20:15


 20 20:20 DC 20 21:00


20 ML











Allergies:


Allergies:





Allergies








Coded Allergies Type Severity Reaction Last Updated Verified


 


  tramadol Allergy Severe Seizures 3/27/17 Yes


 


  Sulfa (Sulfonamide Antibiotics) Allergy Intermediate Itching & GI upset 

3/27/17 Yes


 


  lorazepam Allergy Intermediate Hives 3/27/17 Yes


 


  propoxyphene napsylate Allergy Intermediate Hives & GI upset 3/27/17 Yes


 


  morphine Adverse Reaction Severe  3/27/17 Yes


 


  ibuprofen Adverse Reaction Intermediate NAUSEA/VOMITING 3/27/17 Yes











Physical Exam:


PE:


See Above


Constitutional: Well developed, well nourished, no acute distress, non-toxic 

appearance, morbidly obese. []


HENT: Normocephalic, atraumatic, bilateral external ears normal, nose normal. []


Eyes: PERRLA, EOMI, conjunctiva normal, no discharge. [] 


Neck: Normal range of motion, no tenderness,no stridor. [] 


Cardiovascular:Heart rate regular rhythm


Lungs & Thorax:  Bilateral breath sounds clear to auscultation in upper lobes, 

diminished with scattered wheezes posterior []


Skin: Warm, dry; scabbed skin tear noted to the left elbow, no active bleeding, 

fresh skin tear to the fifth knuckle and to the fourth finger proximal to the 

PIP, no active bleeding; large hematoma noted to right anterior knee with flap 

laceration present, bleeding controlled bandage in place, no visible foreign 

body.


Back: No tenderness


Extremities: Right knee: Anterior TTP, no crepitus, limited range of motion due 

to pain, 2+ edema, no cyanosis; left hand: Tenderness to palpation over the 

fifth knuckle and the right fourth digit between the PIP and the knuckle, no 

crepitus, no obvious deformity, no clubbing, ROM intact, no edema. [] 


Neurologic: Alert and oriented X 3, normal motor function, normal sensory fun

ction, no focal deficits noted. []


Psychologic: Affect normal, judgement normal, mood normal. []





Current Patient Data:


Labs:





                                Laboratory Tests








Test


 20


21:53


 


White Blood Count


 10.2 x10^3/uL


(4.0-11.0)


 


Red Blood Count


 4.69 x10^6/uL


(3.50-5.40)


 


Hemoglobin


 13.4 g/dL


(12.0-15.5)


 


Hematocrit


 41.2 %


(36.0-47.0)


 


Mean Corpuscular Volume


 88 fL ()





 


Mean Corpuscular Hemoglobin 29 pg (25-35)  


 


Mean Corpuscular Hemoglobin


Concent 32 g/dL


(31-37)


 


Red Cell Distribution Width


 16.5 %


(11.5-14.5)  H


 


Platelet Count


 336 x10^3/uL


(140-400)


 


Neutrophils (%) (Auto) 80 % (31-73)  H


 


Lymphocytes (%) (Auto) 10 % (24-48)  L


 


Monocytes (%) (Auto) 6 % (0-9)  


 


Eosinophils (%) (Auto) 3 % (0-3)  


 


Basophils (%) (Auto) 1 % (0-3)  


 


Neutrophils # (Auto)


 8.2 x10^3/uL


(1.8-7.7)  H


 


Lymphocytes # (Auto)


 1.1 x10^3/uL


(1.0-4.8)


 


Monocytes # (Auto)


 0.6 x10^3/uL


(0.0-1.1)


 


Eosinophils # (Auto)


 0.3 x10^3/uL


(0.0-0.7)


 


Basophils # (Auto)


 0.1 x10^3/uL


(0.0-0.2)





                                Laboratory Tests


20 21:53








Vital Signs:





                                   Vital Signs








  Date Time  Temp Pulse Resp B/P (MAP) Pulse Ox O2 Delivery O2 Flow Rate FiO2


 


20 22:20  134   94   


 


20 21:32   35     


 


20 19:39 97.9   120/72 (88)  Room Air  





 97.9       











EKG:


EK-A. fib with RVR, rate of 130, no STEMI, read by Dr. Hu []





Heart Score:


Risk Factors:


Risk Factors:  DM, Current or recent (<one month) smoker, HTN, HLP, family 

history of CAD, obesity.


Risk Scores:


Score 0 - 3:  2.5% MACE over next 6 weeks - Discharge Home


Score 4 - 6:  20.3% MACE over next 6 weeks - Admit for Clinical Observation


Score 7 - 10:  72.7% MACE over next 6 weeks - Early Invasive Strategies





Radiology/Procedures:


Radiology/Procedures:


PROCEDURE: HAND LEFT 3V





EXAM: HAND LEFT 3V 2020 8:14 PM


 


CLINICAL INDICATION: Fourth and fifth digit pain after fall from standing.


 


COMPARISON:None


 


TECHNIQUE:3 views of the left hand


 


FINDINGS:No acute fracture. Alignment is normal. Joint spaces are 


maintained. No focal soft tissue abnormality.


 


IMPRESSION:No acute osseous abnormality.





PROCEDURE: KNEE RIGHT 3V





EXAM: KNEE RIGHT 3V 2020 8:14 PM


 


CLINICAL INDICATION:Laceration on knee, pain after fall from standing a 


gas station.


 


COMPARISON:None


 


TECHNIQUE:3 views of the right knee


 


FINDINGS:No acute fracture. Alignment is normal. There is mild medial 


compartment narrowing and small tricompartmental osteophytes. Small joint 


effusion.


 


IMPRESSION:


No acute osseous abnormality.





Small joint effusion and mild tricompartmental degenerative joint disease.





Laceration Repair by me:


Anesthesia:         1% lidocaine locally with epinephrine


Location:         Right anterior kneeflap laceration


Tendon/Joint/Nerves:      No injury


Foreign body:         None detected after copious irrigation and exploration 

with NS and chlorhexidine


Technique:         9 simple Interrupted Sutures with 4-0 Ethilon


Complexity:          No subcutaneous sutures/mucosal repair/edge excision


Post Closure Length:      5 cm





Patient's bleeding was easily controlled in the department and there is no 

indication of anemia.


No evidence of compartment syndrome, neurologic injury, vascular injury, open 

joint, tendon laceration, or foreign body.


Patient is appropriate for outpatient follow up.























 [][facility]


                    [address] [place] [address]


                                 [phone]


                                        


                                 IMAGING REPORT





                                     Signed





PATIENT: WILEY FERNANDO JACCOUNT: [id number]     MRN#: [id number]


: 1955           LOCATION: [address] [place] [address]         AGE: 65


SEX: F                    EXAM DT: 20         ACCESSION#: [id number]


STATUS: ADM IN            ORD. PHYSICIAN: MANDI FARLEY


REASON: afib with rvr, cardiac workup


PROCEDURE: CHEST AP ONLY





EXAM:


1. CHEST ONE VIEW.


2. RIGHT SHOULDER 3 VIEWS.


 


HISTORY: Atrial fibrillation, fall, right shoulder pain. Lung cancer.


 


COMPARISON: 2018.


 


FINDINGS: A frontal view of the chest is obtained.


 


Enlargement of the right hilum is consistent with posttreatment changes in


the setting of lung cancer. There is no clear interval change. There are 


changes of coronary artery bypass grafting. There is no pneumothorax or 


pleural effusion. The heart is not enlarged. There are atherosclerotic 


calcifications of the aorta. Changes of internal fixation of a left 


proximal humeral fracture are noted.


 


3 views of the right shoulder are obtained.


 


No fractures are appreciated at the right shoulder. Acromioclavicular 


osteoarthritis is moderate. Glenohumeral joint spaces and alignment are 


maintained.


 


IMPRESSION: 


1. Stable posttreatment changes at the right hilum. Ongoing CT follow-up 


is recommended.


2. No fracture.


 


Electronically signed by: BONIFACIO Hernandez MD (2020 12:38 AM) 


[facility]














DICTATED and SIGNED BY:     XIOMY HENRANDEZ MD


DATE:     20 0038





Course & Med Decision Making:


Course & Med Decision Making


Pertinent Labs and Imaging studies reviewed. (See chart for details)


5-RN notified this practitioner the patient's heart rate jumped into the 

130s.  Per the monitor patient appears to be in A. fib.  Will order labs and 

EKG.


4-spoke with Dr. Hammond who is the admitting physician, and care was assumed 

following discussion of patient.  Will admit patient for A. fib RVR, right knee 

contusion, right knee pain, right knee laceration, and multiple skin tears.  

Will consult cardiology and admit patient to the [facility] floor.  Advised Dr. Hammond 

that the CMP and cardiac enzymes are not back yet.  Also the patient's chest x-

ray and right shoulder x-ray have just been read and have not been interpreted 

as of yet.  Patient was given 10 mg of Cardizem IV push followed by a Cardizem 

drip for treatment of the A. fib RVR


Patient's vital signs are stable, heart rate is improving. Patient remains 

afebrile, appears nontoxic, respirations even and unlabored. Patient will be 

admitted to the [facility] floor. Patient's case and plan of care also discussed with 

Dr. Hu


[]





Dragon Disclaimer:


Dragon Disclaimer:


This electronic medical record was generated, in whole or in part, using a voice

 recognition dictation system.





Departure


Departure


Impression:  


   Primary Impression:  


   Atrial fibrillation with RVR


   Additional Impressions:  


   Contusion of right knee, initial encounter


   Laceration of right knee without complication


   Right anterior knee pain


   Multiple skin tears


Disposition:   ADMITTED AS INPT THIS HOSP


Admitting Physician:  Kathy Woods


Condition:  STABLE


Referrals:  


KATHY WOODS MD (PCP)





Problem Qualifiers








   Additional Impressions:  


   Laceration of right knee without complication


   Encounter type:  initial encounter  Qualified Codes:  S81.011A - Laceration 

   without foreign body, right knee, initial encounter








MANDI FARLEY        2020 20:23


SHELDON HU MD               2020 23:59

## 2020-11-06 NOTE — RAD
EXAM: KNEE RIGHT 3V 11/6/2020 8:14 PM

 

CLINICAL INDICATION:Laceration on knee, pain after fall from standing a 

gas station.

 

COMPARISON:None

 

TECHNIQUE:3 views of the right knee

 

FINDINGS:No acute fracture. Alignment is normal. There is mild medial 

compartment narrowing and small tricompartmental osteophytes. Small joint 

effusion.

 

IMPRESSION:

No acute osseous abnormality.

 

Small joint effusion and mild tricompartmental degenerative joint disease.

 

Electronically signed by: Radha Doe MD (11/6/2020 10:25 PM) [id number]

## 2020-11-07 NOTE — PDOC
Provider Note


Date of Service:


DATE: 11/7/20 


TIME: 10:42


Provider Note


Combined H&P and discharge summary dictated #[id number]





Justifications for Admission


Other Justification














BOZENA ST MD               Nov 7, 2020 10:43

## 2020-11-07 NOTE — RAD
EXAM:

1. CHEST ONE VIEW.

2. RIGHT SHOULDER 3 VIEWS.

 

HISTORY: Atrial fibrillation, fall, right shoulder pain. Lung cancer.

 

COMPARISON: 12/12/2018.

 

FINDINGS: A frontal view of the chest is obtained.

 

Enlargement of the right hilum is consistent with posttreatment changes in

the setting of lung cancer. There is no clear interval change. There are 

changes of coronary artery bypass grafting. There is no pneumothorax or 

pleural effusion. The heart is not enlarged. There are atherosclerotic 

calcifications of the aorta. Changes of internal fixation of a left 

proximal humeral fracture are noted.

 

3 views of the right shoulder are obtained.

 

No fractures are appreciated at the right shoulder. Acromioclavicular 

osteoarthritis is moderate. Glenohumeral joint spaces and alignment are 

maintained.

 

IMPRESSION: 

1. Stable posttreatment changes at the right hilum. Ongoing CT follow-up 

is recommended.

2. No fracture.

 

Electronically signed by: BONIFACIO Hernandez MD (11/7/2020 12:38 AM) 

[facility]

## 2020-11-07 NOTE — PDOC2
CARDIOLOGY CONSULT NOTE


DATE OF SERVICE:


DATE: 11/7/20 


TIME: 09:14





CHIEF COMPLAINT:


Fall





HPI:


Pleasant 65 y.o woman presenting for fall. Noted to have afib with rvr, now back

in SR. Denies any chest pain.





PMHX:


See below





SOCHX:


No alcohol, tob or illicit.s





FAMHX:


NC





CURRENT MEDS:





Current Medications








 Medications


  (Trade)  Dose


 Ordered  Sig/Everett


 Route


 PRN Reason  Start Time


 Stop Time Status Last Admin


Dose Admin


 


 Lidocaine/


 Epinephrine


  (LIDOCAINE


 1%-EPI 1:100,000


 Multi-Dose)  20 ml  1X  ONCE


 SQ


   11/6/20 20:15


 11/6/20 20:20 DC 11/6/20 21:00





 


 Diltiazem HCl


  (Cardizem Iv


 Push)  10 mg  1X  ONCE


 IVP


   11/6/20 22:30


 11/6/20 22:31 DC 11/6/20 22:28





 


 Diltiazem HCl 125


 mg/Sodium Chloride  125 ml @ 5


 mls/hr  CONT  PRN


 IV


 SEE I/O RECORD  11/6/20 23:00


    11/6/20 23:14





 


 Fentanyl Citrate


  (Fentanyl 2ml


 Vial)  50 mcg  PRN Q2HR  PRN


 IV


 SEVERE PAIN 7-10  11/6/20 23:00


 11/7/20 22:59  11/6/20 23:12





 


 Amitriptyline HCl


  (Elavil)  25 mg  QHS


 PO


   11/7/20 01:30


    11/7/20 01:40





 


 Atorvastatin


 Calcium


  (Lipitor)  40 mg  HS


 PO


   11/7/20 01:30


    11/7/20 01:40





 


 Docusate Sodium


  (Colace)  100 mg  QODAY


 PO


   11/7/20 09:00


    11/7/20 08:36





 


 EZETIMIBE


  (Zetia)  10 mg  DAILY


 PO


   11/7/20 09:00


    11/7/20 08:37





 


 Cyclobenzaprine


 HCl


  (Flexeril)  10 mg  TID


 PO


   11/7/20 01:30


    11/7/20 08:36





 


 Doxepin HCl


  (SINEquan)  50 mg  QHS


 PO


   11/7/20 01:30


    11/7/20 01:41





 


 Fluoxetine HCl


  (PROzac)  40 mg  DAILY08


 PO


   11/7/20 08:00


    11/7/20 08:36





 


 Multivitamins


  (Thera M Plus)  1 tab  DAILY


 PO


   11/7/20 09:00


    11/7/20 08:36





 


 Pantoprazole


 Sodium


  (Protonix)  40 mg  DAILYAC


 PO


   11/7/20 07:30


    11/7/20 08:36





 


 Quetiapine


 Fumarate


  (SEROquel)  50 mg  QHS


 PO


   11/7/20 01:30


    11/7/20 01:40





 


 Digoxin


  (Lanoxin)  500 mcg  1X  ONCE


 IV


   11/7/20 07:00


 11/7/20 07:06 DC 11/7/20 07:17





 


 Metoprolol


 Tartrate


  (Lopressor Vial)  5 mg  1X  ONCE


 IVP


   11/7/20 07:00


 11/7/20 07:06 DC 11/7/20 07:54














ALLERGIES:





Allergies








Coded Allergies Type Severity Reaction Last Updated Verified


 


  tramadol Allergy Severe Seizures 3/27/17 Yes


 


  Sulfa (Sulfonamide Antibiotics) Allergy Intermediate Itching & GI upset 

3/27/17 Yes


 


  lorazepam Allergy Intermediate Hives 3/27/17 Yes


 


  propoxyphene napsylate Allergy Intermediate Hives & GI upset 3/27/17 Yes


 


  morphine Adverse Reaction Severe  3/27/17 Yes


 


  ibuprofen Adverse Reaction Intermediate NAUSEA/VOMITING 3/27/17 Yes











ROS:


NC





PHYSICAL EXAM:


Vital Signs/I&O:





                                   Vital Signs








  Date Time  Temp Pulse Resp B/P (MAP) Pulse Ox O2 Delivery O2 Flow Rate FiO2


 


11/7/20 08:00 97.7 82 18 143/71 (95) 90 Room Air  





 97.7       














                                    I & O   


 


 11/6/20 11/6/20 11/7/20





 15:00 23:00 07:00


 


Output Total   0 ml


 


Balance   0 ml








Physical Exam:


GEN.:    No apparent distress.  Alert and oriented.


HEENT:    Head is normocephalic, atraumatic


NECK:    Supple.  


LUNGS:    Clear to auscultation.


HEART:    RRR, S1, S2 present.  Peripheral pulses intact


ABDOMEN:    Soft, nontender.  Positive bowel sounds.


EXTREMITIES:    Without any cyanosis.    


NEUROLOGIC:     Normal speech, normal tone


PSYCHIATRIC:    Normal affect, normal mood.


SKIN:   No ulcerations





DIAGNOSTIC TESTING:


Reviewed


Lab





Laboratory Tests








Test


 11/6/20


21:53 11/6/20


23:15


 


White Blood Count


 10.2 x10^3/uL


(4.0-11.0) 





 


Red Blood Count


 4.69 x10^6/uL


(3.50-5.40) 





 


Hemoglobin


 13.4 g/dL


(12.0-15.5) 





 


Hematocrit


 41.2 %


(36.0-47.0) 





 


Mean Corpuscular Volume


 88 fL ()


 





 


Mean Corpuscular Hemoglobin 29 pg (25-35)   


 


Mean Corpuscular Hemoglobin


Concent 32 g/dL


(31-37) 





 


Red Cell Distribution Width


 16.5 %


(11.5-14.5)  H 





 


Platelet Count


 336 x10^3/uL


(140-400) 





 


Neutrophils (%) (Auto) 80 % (31-73)  H 


 


Lymphocytes (%) (Auto) 10 % (24-48)  L 


 


Monocytes (%) (Auto) 6 % (0-9)   


 


Eosinophils (%) (Auto) 3 % (0-3)   


 


Basophils (%) (Auto) 1 % (0-3)   


 


Neutrophils # (Auto)


 8.2 x10^3/uL


(1.8-7.7)  H 





 


Lymphocytes # (Auto)


 1.1 x10^3/uL


(1.0-4.8) 





 


Monocytes # (Auto)


 0.6 x10^3/uL


(0.0-1.1) 





 


Eosinophils # (Auto)


 0.3 x10^3/uL


(0.0-0.7) 





 


Basophils # (Auto)


 0.1 x10^3/uL


(0.0-0.2) 





 


Sodium Level


 


 144 mmol/L


(136-145)


 


Potassium Level


 


 3.9 mmol/L


(3.5-5.1)


 


Chloride Level


 


 105 mmol/L


()


 


Carbon Dioxide Level


 


 29 mmol/L


(21-32)


 


Anion Gap  10 (6-14)  


 


Blood Urea Nitrogen


 


 14 mg/dL


(7-20)


 


Creatinine


 


 0.8 mg/dL


(0.6-1.0)


 


Estimated GFR


(Cockcroft-Gault) 


 72.4  





 


BUN/Creatinine Ratio  18 (6-20)  


 


Glucose Level


 


 109 mg/dL


(70-99)  H


 


Calcium Level


 


 9.7 mg/dL


(8.5-10.1)


 


Total Bilirubin


 


 0.3 mg/dL


(0.2-1.0)


 


Aspartate Amino Transf


(AST/SGOT) 


 16 U/L (15-37)





 


Alkaline Phosphatase


 


 137 U/L


()  H


 


Creatine Kinase


 


 75 U/L


()


 


Creatine Kinase MB (Mass)


 


 1.1 ng/mL


(0.0-3.6)


 


Creatine Kinase MB Relative


Index 


 1.5 % (0-4)  





 


Total Protein


 


 7.2 g/dL


(6.4-8.2)


 


Albumin


 


 3.6 g/dL


(3.4-5.0)


 


Albumin/Globulin Ratio  1.0 (1.0-1.7)  





                                Laboratory Tests


11/6/20 21:53








11/6/20 23:15











ASSESSMENT:


1. Fall with afib with RVR. 


2.





PLAN:


1. Amiodarone 200mg bid x 7 days, then 200mg daily. 


2. Outpt event monitoring and f/u in 4-6 weeks. 


3. Outpt echo. 


4. Hold anticoagulation given frequent falls until afib burden has been 

determined. 


5. Metoprolol 25mg bid.











AMANDA BIRD MD        Nov 7, 2020 09:15

## 2020-11-07 NOTE — RAD
EXAM:

1. CHEST ONE VIEW.

2. RIGHT SHOULDER 3 VIEWS.

 

HISTORY: Atrial fibrillation, fall, right shoulder pain. Lung cancer.

 

COMPARISON: 12/12/2018.

 

FINDINGS: A frontal view of the chest is obtained.

 

Enlargement of the right hilum is consistent with posttreatment changes in

the setting of lung cancer. There is no clear interval change. There are 

changes of coronary artery bypass grafting. There is no pneumothorax or 

pleural effusion. The heart is not enlarged. There are atherosclerotic 

calcifications of the aorta. Changes of internal fixation of a left 

proximal humeral fracture are noted.

 

3 views of the right shoulder are obtained.

 

No fractures are appreciated at the right shoulder. Acromioclavicular 

osteoarthritis is moderate. Glenohumeral joint spaces and alignment are 

maintained.

 

IMPRESSION: 

1. Stable posttreatment changes at the right hilum. Ongoing CT follow-up 

is recommended.

2. No fracture.

 

Electronically signed by: BONIFACIO Hernandez MD (11/7/2020 12:38 AM) 

[facility]

## 2020-11-07 NOTE — DISCH
DISCHARGE INSTRUCTIONS


Condition on Discharge


Condition on Discharge:  Stable





Activity After Discharge


Activity Instructions for Disc:  Activity as tolerated


Bathing Instructions:  Shower-keep dressing dry


Lifting Instructions after Dis:  No heavy lifting


Exercise Instruction after Dis:  Progress as tolerated


Weight Bearing Status after Di:  As tolerated





Diet after Discharge


Diet after Discharge:  Cardiac, Diabetic No Calorie Level


Diet Texture:  Regular


Liquid Texture:  Thin Liquid





Wound Incision Care


Wound/Incision Care:  Change dressing


Other wound/incision instructi:  Continue wound care





Checks after Discharge


Checks after discharge:  Check blood press - daily, Weigh Yourself Daily





Contacting the [facility] after DC


Call your doctor for:  Concerns you may have





Follow-Up


Follow up with:  Dr. Junior in 5 days


Follow Up With:   as diirected





Treatment/Equipment after DC


Adaptive Equipment Issued:  None











BOZENA ST MD               Nov 7, 2020 10:52

## 2020-11-07 NOTE — HP
ADMIT DATE:  11/07/2020



COMBINED HISTORY AND PHYSICAL AND DISCHARGE SUMMARY



PRIMARY CARE PHYSICIAN:  Kathy Junior MD



HISTORY OF PRESENT ILLNESS:  This 65-year-old female fell yesterday when she

tripped on a mat.  She had multiple superficial lacerations, especially on the

right knee, right hand and also the left elbow.  Because of the injury, she came

to the Emergency Room.  In the Emergency Room, the patient was noted to have an

episode of atrial fibrillation with fast ventricular rate of 130 per minute. 

The patient was started on IV Cardizem and admitted for further evaluation and

management.



REVIEW OF SYSTEMS:  At present time, the patient denies any cold, congestion,

fever, chills, dyspnea, dizziness, or palpitations.  She does admit to pain in

her joints and lacerations.  She has chronic cough from her COPD.  Other systems

reviewed and are negative.



PAST MEDICAL HISTORY:  The patient has a history of coronary artery disease,

lung cancer and had radiation and chemotherapy, hypertension, hyperlipidemia and

anemia of chronic disease.



PAST SURGICAL HISTORY:  Includes coronary artery bypass graft surgery, knee

replacement, and radiation treatment to lung cancer.



ALLERGIES:  THE PATIENT IS ALLERGIC TO SULFA, IBUPROFEN, LORAZEPAM, MORPHINE,

DARVOCET, TRAMADOL CAUSES NAUSEA AND VOMITING.  SHE IS ABLE TO TAKE OXYCODONE.



MEDICATIONS:  Reviewed and reconciled.



SOCIAL HISTORY:  The patient smoked for at least 30 years.



FAMILY HISTORY:  Positive for heart disease.



PHYSICAL EXAMINATION:

VITAL SIGNS:  Temperature 98 degrees Fahrenheit, pulse 112 to 132 per minute on

admission, respirations 18 per minute, blood pressure 89/51 mmHg.  This morning,

blood pressure is 143/71 mmHg, pulse is 82 per minute and now she is in sinus

rhythm.

EYES:  Pupils reacting to light.  Conjunctivae pale.  Sclerae muddy.

HENT:  Unremarkable.

NECK:  Supple.  JVP normal.  No thyromegaly.  Trachea midline.

LUNGS:  Decreased breath sounds at bases.

CARDIOVASCULAR:  S1, S2 regular.

ABDOMEN:  Soft, nontender, no guarding, no rigidity.  Bowel sounds present.

EXTREMITIES:  No edema, no cyanosis, no calf tenderness.

SKIN:  Warm and dry.  The patient has multiple lacerations, especially on the

right knee and left elbow.  She had some stitches.  She has lacerations on the

left hand.

CENTRAL NERVOUS SYSTEM:  Alert and oriented, moves extremities.  No acute

changes noted.



LABORATORY FINDINGS:  X-rays of the right shoulder showed no fracture.  X-rays

of the chest showed no acute changes.  The patient has a previous left proximal

humeral fracture, for which she had surgery.  No acute changes on x-rays of the

left hand and right knee.  She does have osteoarthritis of the right knee.



WBC count is 10.2, hemoglobin 13.4.  Sodium 144, potassium 3.9, glucose 109, AST

16, ALT 22, alkaline phosphatase 137.  Cardiac enzymes are normal.  Troponin

less than 0.017.  Albumin 3.6.



FINAL DIAGNOSES:

1.  Atrial fibrillation with fast ventricular rate, converted to sinus rhythm

with IV Cardizem drip.

2.  Multiple lacerations requiring local care and stitches.  The patient has

lacerations of the left hand, left elbow, and right knee, status post fall.

3.  Chronic obstructive pulmonary disease.

4.  History of carcinoma of lung with radiation therapy and chemotherapy.

5.  Coronary artery disease, status post coronary artery bypass graft.

6.  Chronic pain.

7.  Osteoarthritis.

8.  Hypertension.

9.  Hyperlipidemia.

10.  Anemia of chronic disease.



PLAN:  Discussed with Dr. Johnson.  The patient is now in sinus rhythm.  The

patient will be discharged home on metoprolol 25 mg p.o. b.i.d. and amiodarone

200 mg twice a day for 1 week and then once a day thereafter.  She will also get

an event recorder.  Follow up with the cardiologist.  Follow up with Dr. Junior

in 5 days.  Because of her pain, I will give her hydrocodone, but I have advised

her to use sparingly because of her history of lung problems.  I have given her

hydrocodone 7.5/325 one q. 6 hours p.r.n. #28.  For details, please refer to the

orders.



CONDITION AT THE TIME OF DISCHARGE:  Stable.



ACTIVITY:  As tolerated.



DIET:  Cardiac diet.  For other details, please refer to the discharge papers.

 



______________________________

BOZENA ST MD DR:  JANEE/rosalind  JOB#:  [id number] / [id number]

DD:  11/07/2020 10:42  DT:  11/07/2020 11:13



KATHY Llamas MD

## 2020-11-09 NOTE — RAD
CT of the chest without contrast, 10/2/2018:

 

HISTORY: Lung cancer, pneumonia

 

Noncontrast scans were obtained as requested and compared to a study from 

1/5/2018.

 

There is moderate calcific plaquing of the thoracic aorta and its branches

including the coronary arteries. No mediastinal adenopathy is seen.

 

There is a spiculated density at the right hilum with underlying narrowing

and distortion of the central bronchi. This reportedly represents a 

treated neoplasm. Clear separation of possible residual tumor from 

vascular structures is not possible on these noncontrast scans. A similar 

appearance was present on 1/5/2018.

 

Emphysematous changes are present in the upper lobes with small subpleural

blebs. Several small peripheral linear and groundglass opacities in the 

lungs suggest scarring and/or atelectasis. There is a new small 

nonspecific peripheral groundglass opacity in the anterior aspect of the 

right middle lobe as seen on axial image 26 suggesting minimal atelectasis

or inflammation.

 

The small right pleural effusion has increased in volume since the 

previous study. There is now a trace amount of left-sided pleural fluid. A

tiny density along the superior margin of the superior segment of the left

lower lobe probably represents fissural extension of pleural fluid.

 

There has been a previous median sternotomy. Mild scattered degenerative 

changes are present in the spine.

 

IMPRESSION:

1. Spiculated right hilar mass similar to that seen on 1/5/2018, 

compatible with a treated lung malignancy.

2. Small right pleural effusion which has increased slightly in size.

3. A trace amount of left-sided pleural fluid has developed.

4. Emphysema with parenchymal scarring.

5. New minimal nonspecific peripheral right middle lobe groundglass 

opacity.

 

 

PQRS Compliance Statement:

 

One or more of the following individualized dose reduction techniques were

utilized for this examination:  

1. Automated exposure control  

2. Adjustment of the mA and/or kV according to patient size  

3. Use of iterative reconstruction technique

 

Electronically signed by: Rick Moritz, MD (10/2/2018 4:42 PM) [facility] Pt returning call would like a call back today at [phone] .

## 2020-11-09 NOTE — EKG
[facility]

              [address] [place] [address]

Test Date:    2020               Test Time:    21:42:08

Pat Name:     WILEY FERNANDO         Department:   

Patient ID:   [id number]           Room:          

Gender:       F                        Technician:   

:          [id number]               Requested By: MANDI FARLEY

Order Number: [id number]           Reading MD:     

                                 Measurements

Intervals                              Axis          

Rate:         130                      P:            

MI:                                    QRS:          34

QRSD:         86                       T:            105

QT:           330                                    

QTc:          493                                    

                           Interpretive Statements

IRREGULAR RHYTHM, NO P-WAVE FOUND

ST & T ABNORMALITY, CONSIDER

HIGH LATERAL ISCHEMIA OR LEFT VENTRICULAR STRAIN

INFERIOR ISCHEMIA OR LEFT VENTRICULAR STRAIN

ABNORMAL ECG

RI6.02

No previous ECG available for comparison

## 2021-01-04 NOTE — RAD
EXAM: Chest, single view.



HISTORY: Shortness of air.



COMPARISON: 12/28/2020



FINDINGS: A frontal view of the chest obtained. There is stable diffuse interstitial infiltrate. Ther
e is a stable prominent cardiac silhouette and prominent central pulmonary vessels. There is evidence
 of prior CABG. No pleural fusion or pneumothorax is seen. There is internal fixation of the proximal
 left humerus.



IMPRESSION: Stable diffuse interstitial infiltrate.



Electronically signed by: Karla Angulo MD (1/4/2021 2:48 PM) [id number]

## 2021-01-04 NOTE — PHYS DOC
Past Medical History


Past Medical History:  A-Fib, Anemia, CAD, Cancer, CHF, COPD, Hypertension, MI, 

Pneumonia, Other


Additional Past Medical Histor:  CARDIAC ISSUES,RADIATION/CHEMO FOR RIGHT LUNG 

CA,CHRONIC BACK PAIN,RVR


Past Surgical History:  Coronary Bypass Surgery, Knee Replacement, Other


Additional Past Surgical Histo:  LEFT SHOULDER REPLACEMENT


Smoking Status:  Former Smoker


Additional Information:  


QUIT SMOKING APPROXIMATELY 2 WEEKS AGO


Alcohol Use:  None


Drug Use:  None





General Adult


EDM:


Chief Complaint:  SHORTNESS OF BREATH





HPI:


HPI:





Patient is a 65  year old female with history of A. fib, hypertension, CHF, 

COPD, MI, who presents to the ED today complaining of shortness of breath, 

cough, symptoms of 4 days.  Patient was discharged from the hospital 4 days ago.

 Denies any fever.  Has received a breathing treatment in route to the ED with 

slight relief of her symptoms.  Denies any chest pain.





Review of Systems:


Review of Systems:


Constitutional:   Denies fever or chills. []


Eyes:   Denies change in visual acuity. []


HENT:   Denies nasal congestion or sore throat. [] 


Respiratory:   Reports shortness of breath, cough


Cardiovascular:   Denies chest pain or edema. [] 


GI:   Denies abdominal pain, nausea, vomiting, bloody stools or diarrhea. [] 


:  Denies dysuria. [] 


Musculoskeletal:   Denies back pain or joint pain. [] 


Integument:   Denies rash. [] 


Neurologic:   Denies headache, focal weakness or sensory changes. [] 





Psychiatric:  Denies depression or anxiety. []





Heart Score:


Risk Factors:


Risk Factors:  DM, Current or recent (<one month) smoker, HTN, HLP, family 

history of CAD, obesity.


Risk Scores:


Score 0 - 3:  2.5% MACE over next 6 weeks - Discharge Home


Score 4 - 6:  20.3% MACE over next 6 weeks - Admit for Clinical Observation


Score 7 - 10:  72.7% MACE over next 6 weeks - Early Invasive Strategies





Current Medications:





Current Medications








 Medications


  (Trade)  Dose


 Ordered  Sig/Everett  Start Time


 Stop Time Status Last Admin


Dose Admin


 


 Azithromycin  250 ml @ 


 250 mls/hr  1X  ONCE  1/4/21 16:30


 1/4/21 17:29 DC 1/4/21 16:44


250 MLS/HR


 


 Ceftriaxone Sodium


  (Rocephin)  1 gm  1X  ONCE  1/4/21 16:30


 1/4/21 16:31 DC 1/4/21 16:44


1 GM


 


 Dexamethasone


 Sodium Phosphate


  (Decadron)  10 mg  1X  ONCE  1/4/21 16:30


 1/4/21 16:31 DC 1/4/21 16:43


10 MG











Allergies:


Allergies:





Allergies








Coded Allergies Type Severity Reaction Last Updated Verified


 


  tramadol Allergy Severe Seizures 3/27/17 Yes


 


  Sulfa (Sulfonamide Antibiotics) Allergy Intermediate Itching & GI upset 

3/27/17 Yes


 


  lorazepam Allergy Intermediate Hives 3/27/17 Yes


 


  propoxyphene napsylate Allergy Intermediate Hives & GI upset 3/27/17 Yes


 


  morphine Adverse Reaction Severe  3/27/17 Yes


 


  ibuprofen Adverse Reaction Intermediate NAUSEA/VOMITING 3/27/17 Yes











Physical Exam:


PE:





Constitutional: Well developed, well nourished, no acute distress, non-toxic 

appearance. []


HENT: Normocephalic, atraumatic, bilateral external ears normal, oropharynx 

moist, no oral exudates, nose normal. []


Eyes: PERRLA, EOMI, conjunctiva normal, no discharge. [] 


Neck: Normal range of motion, no tenderness, supple, no stridor. [] 


Cardiovascular:Heart rate regular rhythm, no murmur []


Lungs & Thorax:  Bilateral breath sounds clear to auscultation []


Abdomen: Bowel sounds normal, soft, no tenderness, no masses, no pulsatile 

masses. [] 


Skin: Warm, dry, no erythema, no rash. [] 


Back: No tenderness, no CVA tenderness. [] 


Extremities: No tenderness, no cyanosis, no clubbing, ROM intact, no edema. [] 


Neurologic: Alert and oriented X 3, normal motor function, normal sensory 

function, no focal deficits noted. []


Psychologic: Affect normal, judgement normal, mood normal. []





Current Patient Data:


Labs:





                                Laboratory Tests








Test


 1/4/21


14:24 1/4/21


14:33 1/4/21


17:23


 


Influenza Type A Antigen


 Negative


(NEGATIVE) 


 





 


Influenza Type B Antigen


 Negative


(NEGATIVE) 


 





 


White Blood Count


 


 10.1 x10^3/uL


(4.0-11.0) 





 


Red Blood Count


 


 4.07 x10^6/uL


(3.50-5.40) 





 


Hemoglobin


 


 10.4 g/dL


(12.0-15.5)  L 





 


Hematocrit


 


 33.6 %


(36.0-47.0)  L 





 


Mean Corpuscular Volume


 


 82 fL ()


 





 


Mean Corpuscular Hemoglobin  26 pg (25-35)   


 


Mean Corpuscular Hemoglobin


Concent 


 31 g/dL


(31-37) 





 


Red Cell Distribution Width


 


 16.4 %


(11.5-14.5)  H 





 


Platelet Count


 


 327 x10^3/uL


(140-400) 





 


Neutrophils (%) (Auto)  85 % (31-73)  H 


 


Lymphocytes (%) (Auto)  8 % (24-48)  L 


 


Monocytes (%) (Auto)  6 % (0-9)   


 


Eosinophils (%) (Auto)  1 % (0-3)   


 


Basophils (%) (Auto)  0 % (0-3)   


 


Neutrophils # (Auto)


 


 8.6 x10^3/uL


(1.8-7.7)  H 





 


Lymphocytes # (Auto)


 


 0.8 x10^3/uL


(1.0-4.8)  L 





 


Monocytes # (Auto)


 


 0.6 x10^3/uL


(0.0-1.1) 





 


Eosinophils # (Auto)


 


 0.0 x10^3/uL


(0.0-0.7) 





 


Basophils # (Auto)


 


 0.0 x10^3/uL


(0.0-0.2) 





 


Segmented Neutrophils %  84 % (35-66)  H 


 


Band Neutrophils %  4 % (0-9)   


 


Lymphocytes %  8 % (24-48)  L 


 


Monocytes %  4 % (0-10)   


 


Platelet Estimate


 


 Adequate


(ADEQUATE) 





 


Anisocytosis  Slight   


 


Prothrombin Time


 


 14.1 SEC


(11.7-14.0)  H 





 


Prothrombin Time INR  1.1 (0.8-1.1)   


 


Activated Partial


Thromboplast Time 


 33 SEC (24-38)


 





 


Sodium Level


 


 144 mmol/L


(136-145) 





 


Potassium Level


 


 4.0 mmol/L


(3.5-5.1) 





 


Chloride Level


 


 104 mmol/L


() 





 


Carbon Dioxide Level


 


 30 mmol/L


(21-32) 





 


Anion Gap  10 (6-14)   


 


Blood Urea Nitrogen


 


 22 mg/dL


(7-20)  H 





 


Creatinine


 


 0.9 mg/dL


(0.6-1.0) 





 


Estimated GFR


(Cockcroft-Gault) 


 63.2  


 





 


BUN/Creatinine Ratio  24 (6-20)  H 


 


Glucose Level


 


 94 mg/dL


(70-99) 





 


Lactic Acid Level


 


 1.8 mmol/L


(0.4-2.0) 





 


Calcium Level


 


 8.9 mg/dL


(8.5-10.1) 





 


Magnesium Level


 


 2.3 mg/dL


(1.8-2.4) 





 


Total Bilirubin


 


 0.5 mg/dL


(0.2-1.0) 





 


Aspartate Amino Transferase


(AST) 


 50 U/L (15-37)


H 





 


Alanine Aminotransferase (ALT)


 


 86 U/L (14-59)


H 





 


Alkaline Phosphatase


 


 189 U/L


()  H 





 


Troponin I Quantitative


 


 < 0.017 ng/mL


(0.000-0.055) < 0.017 ng/mL


(0.000-0.055)


 


NT-Pro-B-Type Natriuretic


Peptide 


 5235 pg/mL


(0-124)  H 





 


Total Protein


 


 6.8 g/dL


(6.4-8.2) 





 


Albumin


 


 3.4 g/dL


(3.4-5.0) 





 


Albumin/Globulin Ratio  1.0 (1.0-1.7)   


 


Procalcitonin


 


 < 0.10 ng/mL


(0.00-0.10) 





 


Thyroid Stimulating Hormone


(TSH) 


 2.132 uIU/mL


(0.358-3.74) 








                                Laboratory Tests


1/4/21 14:33








                                Laboratory Tests


1/4/21 14:33








Vital Signs:





                                   Vital Signs








  Date Time  Temp Pulse Resp B/P (MAP) Pulse Ox O2 Delivery O2 Flow Rate FiO2


 


1/4/21 16:51  80 24 161/99 (119)  Room Air  


 


1/4/21 15:51     93   


 


1/4/21 14:07 98.1       





 98.1       











EKG:


EKG:


[]





Radiology/Procedures:


Radiology/Procedures:


[]PROCEDURE: PORTABLE CHEST 1V





EXAM: Chest, single view.





HISTORY: Shortness of air.





COMPARISON: 12/28/2020





FINDINGS: A frontal view of the chest obtained. There is stable diffuse 

interstitial infiltrate. There is a stable prominent cardiac silhouette and 

prominent central pulmonary vessels. There is evidence of prior CABG. No pleural

 fusion or pneumothorax is seen. There is internal fixation of the proximal left

 humerus.





IMPRESSION: Stable diffuse interstitial infiltrate.





Electronically signed by: Karla Thomas MD (1/4/2021 2:48 PM) [id number]














DICTATED and SIGNED BY:     KARLA THOMAS MD


DATE:     01/04/21 [id number]





Course & Med Decision Making:


Course & Med Decision Making


Pertinent Labs and Imaging studies reviewed. (See chart for details)





This is a 65-year-old female patient presenting to the ED today complaining of 

shortness of breath for 4 days.  Labs are negative for any acute findings, chest

 x-ray noted for stable bilateral interstitial infiltrates.  Started on Roce

phin, azithromycin, and Decadron.





Spoke with  who accepted patient for admission





Routine consult placed for pulmonary request from [facility] Disclaimer:


Laurie Disclaimer:


This electronic medical record was generated, in whole or in part, using a voice

 recognition dictation system.





Departure


Departure


Impression:  


   Primary Impression:  


   Bilateral pulmonary infiltrates on CXR


   Additional Impressions:  


   Person under investigation for COVID-19


   Shortness of breath


Disposition:  09 ADMITTED AS INPT THIS HOSP


Condition:  STABLE


Referrals:  


ALBAN WOODS MD (PCP)











SVETA STRICKLAND               Jan 4, 2021 18:49

## 2021-01-05 NOTE — EKG
[facility]

              [address] [place] [address]

Test Date:    2021               Test Time:    14:18:11

Pat Name:     WILEY FERNANDO         Department:   

Patient ID:   [id number]           Room:         4 

Gender:       F                        Technician:   

:          [id number]               Requested By: SVETA STRICKLAND

Order Number: [id number]           Reading MD:   Oren Gutierrez

                                 Measurements

Intervals                              Axis          

Rate:         84                       P:            13

VT:           168                      QRS:          26

QRSD:         92                       T:            75

QT:           396                                    

QTc:          471                                    

                           Interpretive Statements

SINUS RHYTHM

Electronically Signed On 2021 13:36:37 CST by Oren Gutierrez

## 2021-01-05 NOTE — HP
ADMIT DATE:  01/04/2021



MEDICAL HISTORY AND PHYSICAL



REASON FOR ADMISSION TO THE HOSPITAL:  Shortness of breath, COPD with

exacerbation.



HISTORY OF PRESENT ILLNESS:  The patient is a 65-year-old female whose third

admission in last 1 month for similar problems.  The patient has chronic COPD,

continues to smoke and she was admitted with shortness of breath and wheezing,

was given Solu-Medrol, IV antibiotics and Pulmonary was consulted.  The patient

had a previous COVID test, which was negative.



PAST MEDICAL HISTORY:  She has a history of lung cancer, had a radiation

treatment; history of coronary artery disease, bypass surgery; hypertension;

hyperlipidemia; arthritis.



PAST SURGICAL HISTORY:  Bypass surgery, knee surgery, shoulder replacement.



SOCIAL HISTORY:  Smoked for 40 years, still smokes.  Denies alcohol.  Denies any

street drugs.



ALLERGIES:  TO SULFA, IBUPROFEN, LORAZEPAM, MORPHINE, DARVOCET, TRAMADOL.



MEDICATIONS AT HOME:  The patient is on albuterol inhaler, amiodarone 200 mg

daily, amitriptyline 25 mg daily, aspirin 81 mg daily, atorvastatin 40 mg daily,

Soma 350 mg 4 times a day, Colace 100 mg daily, doxepin 50 mg daily, Zetia 10 mg

daily, fluoxetine 40 mg daily, metoprolol 25 mg twice a day, multivitamin daily,

omeprazole 20 mg daily, Seroquel 50 mg at bedtime.  She is on and off on

steroids, not at maintenance dose.



REVIEW OF SYMPTOMS:  Complains of cough, wheezing.  Denies any chest pain and

rest of the 14-system was reviewed.



PHYSICAL EXAMINATION:

GENERAL:  The patient is not in any distress.

VITAL SIGNS:  Temperature 97, pulse 74, respirations 16, blood pressure 124/63,

94 on 3 liters.

HEENT:  Head is  atraumatic.  Pupils equal.  Oral cavity:  No congestion.

NECK:  Supple.  Thyroid not enlarged.  JVD not elevated.

CHEST:  Scar of heart surgery.

CARDIOVASCULAR:  S1, S2.

LUNGS:  Wheezing, bilateral crackles at the base.

ABDOMEN:  Soft, bowel sounds are present, no mass palpable.

EXTERNAL GENITALIA:  No Orellana.

RECTAL:  Deferred.

EXTREMITIES:  Very trace edema.

NEUROLOGIC:  Moving all extremities.  No focal deficits noted.



LABORATORY DATA:  Show white count 10, hemoglobin 10, platelets 377.  INR 1.1. 

Electrolytes are sodium 144, potassium 4.0, chloride 104, bicarb 30, BUN 22,

creatinine 0.9, glucose 94.  LFTs:  AST 50, ALT 86.  BNP 5235.  Troponin

negative.  Urine drug screen was negative.  Influenza is negative.  Chest x-ray

shows diffuse interstitial infiltrates.  The patient had a CT chest 6 weeks ago.

 Echocardiogram also 6 weeks ago shows a good left ventricular function 60%, PA

pressure is high at 50.



FINAL IMPRESSION:

1.  Chronic obstructive pulmonary disease with acute exacerbation.

2.  Chronic obstructive pulmonary disease.

3.  Tobacco addiction, smoking history.

4.  Coronary artery disease, history of bypass surgery.

5.  History of lung cancer, radiation treatment.

6.  Hypertension.



PLAN:  At this time, the patient may need home oxygen.  The patient has a

nebulizer at home.  Continue prednisone, IV Solu-Medrol, IV Zithromax and

Rocephin and Pulmonary consult.  DVT prophylaxis and COVID test was ordered and

see how she does and smoking counseling was done.

 



______________________________

ALBAN WOODS MD



DR:  LINETTE/rosalind  JOB#:  [id number] / [id number]

DD:  01/05/2021 17:03  DT:  01/05/2021 19:05

## 2021-01-05 NOTE — PDOC
Provider Note


Date of Service:


DATE: 1/5/21 


TIME: 09:08


Provider Note


Pt seen .H&P dictated.#[id number].


Dx .COPD  exacerbration





Justifications for Admission


Other Justification














ALBAN WOODS MD              Jan 5, 2021 09:08

## 2021-01-05 NOTE — CONS
DATE OF CONSULTATION:  



PULMONARY CONSULTATION



ATTENDING PHYSICIAN:  Kathy Junior MD



REASON FOR CONSULTATION:  Dyspnea, respiratory failure.



HISTORY OF PRESENT ILLNESS:  The patient is a 65-year-old obese patient with a

BMI of 46.  The patient has smoking history for 40 years and still smokes

cigarettes.  She has history of CHF as well and history of atrial fibrillation. 

She was brought into the hospital with a 2-day history of shortness of breath. 

She also has a cough, which has been nonproductive.  No fever, no chills, no

chest pain.  She was recently discharged from the hospital 4 days ago.  She is

not on home oxygen, but currently requiring nasal cannula at 3 liters.  The

patient's chest x-ray was reviewed and was consistent with interstitial

infiltrates, suggestive of CHF.  I have been asked to see her for further

evaluation.  She appears to be weak.



PAST MEDICAL HISTORY:  Significant for AFib, history of anemia, CAD, history of

CHF, COPD, hypertension, MI, pneumonia,  history of radiation and chemo for

right lung cancer, history of chronic back pain, history of arthritis.



PAST SURGICAL HISTORY:  Coronary artery bypass surgery, knee surgery, left

shoulder replacement.



SOCIAL HISTORY:  Smoker for 40 years, still smokes cigarettes.



REVIEW OF SYSTEMS:  Ten-point system obtained.  Pertinent positives discussed in

my history of present illness, otherwise noncontributory.  All systems that were

negative were reviewed as well.



ALLERGIES:  All reviewed as listed in the MRAD.



MEDICATIONS:  Reviewed as listed in the MRAD including antibiotic, Rocephin and

Zithromax, amiodarone, Lovenox and IV steroids.



PHYSICAL EXAMINATION:

VITAL SIGNS:  Reviewed.  She is afebrile, blood pressure stable, pulse ox 94% on

3 liters.

NECK:  Supple.

LUNGS:  With diminished breath sounds and some wheezing.

CARDIOVASCULAR:  With a regular rate.

ABDOMEN:  Soft, obese.

EXTREMITIES:  With trace pitting edema.



LABORATORY DATA:  Reviewed.  Influenza screen is negative.  Toxicology screen

negative.  Renal function with a BUN of 22, creatinine 0.9.  AST and ALT

elevated.  INR 1.1.  White cell count 10.1.



IMPRESSION:

1.  Acute hypoxic respiratory failure secondary to multifactorial etiologies

including combination of acute exacerbation of chronic obstructive pulmonary

disease and suspected diastolic heart failure.

2.  The patient with 40 years of tobacco use and still smoking cigarettes

consistent with COPD.

3.  History of atrial fibrillation.

4.  History of diastolic congestive heart failure.



RECOMMENDATIONS:

1.  Continue present oxygen to keep saturation 92 and above.

2.  Empiric antibiotic for now for acute bronchitis.

3.  Trial of diuresis to see an improvement in oxygenation.

4.  Continue DuoNebs.

5.  Continue IV dexamethasone.

6.  Lovenox for DVT prophylaxis.

7.  We will consider 6-minute walk test in the next 24 hours as she clinically

improves.

8.  Discussed with RN.

 



______________________________

JARED LAM MD



DR:  WANDER/rosalind  JOB#:  [id number] / [id number]

DD:  01/05/2021 10:30  DT:  01/05/2021 10:41

## 2021-01-05 NOTE — NUR
SW following for discharge planning. Spoke with RN and reviewed chart. SW familiar with pt 
from last admission. Pt discharged home, self-care on room air on 12/30 with a negative 
COVID result from 12/29. Pt currently on 2l 02, COVID pending. SW requested 6 min walk to 
check for possible home 02 needs. Possible discharge home today, self-care. SW following.

-------------------------------------------------------------------------------

Addendum: 01/05/21 at 1226 by RC STEPHENS

-------------------------------------------------------------------------------

Spoke with RN. Pt will not discharge home today per pulmonary. ANGELO will continue following.

## 2021-01-05 NOTE — NUR
Wound/Ostomy Care



Wound Type/Assessment:  

Pt seen per wound care consult. See wound assessment. Pt is well known to us from the wound 
clinic and previous admissions. Pt has skin tear to left forearm and an abrasion to the 
right knee which is now scabbed and has healed significantly since last admission. Wounds 
cleansed and assessed. 





Treatment Recommendations/Plan:  

Recommendations for xeroform gauze to left forearm and skin prep to the right knee and leave 
open to air. Pt has had multiple falls recently with multiple self inflicted injuries. 



Education provided: 

Pt educated on dressing changes and wound care follow up if needed. 



Offloading surface/device: 

N/A 



Recommended Referrals/Tests: 

N/A 



Discharge Recommendations for dressings: 

Dressing applied. Dressing change instructions left in room. No other wounds noted, just 
multiple bruises at different stages of healing. Pt may follow up in wound clinic if needed 
after discharge for the left forearm. Bed lowered and call light in reach. Wound care will 
follow up for reassessment on 1/12/21.

## 2021-01-06 VITALS
SYSTOLIC BLOOD PRESSURE: 134 MMHG | SYSTOLIC BLOOD PRESSURE: 134 MMHG | DIASTOLIC BLOOD PRESSURE: 61 MMHG | DIASTOLIC BLOOD PRESSURE: 61 MMHG

## 2021-01-06 NOTE — PDOC
PULMONARY PROGRESS NOTES


DATE: 1/6/21 


TIME: 09:04


Subjective


pt. is resting on room air this am 


no increased SOA or cough 


no overnight events


Vitals





Vital Signs








  Date Time  Temp Pulse Resp B/P (MAP) Pulse Ox O2 Delivery O2 Flow Rate FiO2


 


1/6/21 08:31  70  136/57    


 


1/6/21 08:30      Nasal Cannula 2.0 


 


1/6/21 03:41 97.5  18  97   





 97.5       








ROS:  No Nausea, No Chest Pain, No Abdominal Pain, No Increase Cough


General:  Alert, No acute distress


HEENT:  Other


Lungs:  Clear


Cardiovascular:  S1, S2


Abdomen:  Soft, Non-tender


Extremities:  Other


Skin:  Warm, Dry


Labs





Laboratory Tests








Test


 1/4/21


14:24 1/4/21


14:33 1/4/21


17:23 1/4/21


18:40


 


Influenza Type A Antigen


 Negative


(NEGATIVE) 


 


 





 


Influenza Type B Antigen


 Negative


(NEGATIVE) 


 


 





 


White Blood Count


 


 10.1 x10^3/uL


(4.0-11.0) 


 





 


Red Blood Count


 


 4.07 x10^6/uL


(3.50-5.40) 


 





 


Hemoglobin


 


 10.4 g/dL


(12.0-15.5) 


 





 


Hematocrit


 


 33.6 %


(36.0-47.0) 


 





 


Mean Corpuscular Volume  82 fL ()   


 


Mean Corpuscular Hemoglobin  26 pg (25-35)   


 


Mean Corpuscular Hemoglobin


Concent 


 31 g/dL


(31-37) 


 





 


Red Cell Distribution Width


 


 16.4 %


(11.5-14.5) 


 





 


Platelet Count


 


 327 x10^3/uL


(140-400) 


 





 


Neutrophils (%) (Auto)  85 % (31-73)   


 


Lymphocytes (%) (Auto)  8 % (24-48)   


 


Monocytes (%) (Auto)  6 % (0-9)   


 


Eosinophils (%) (Auto)  1 % (0-3)   


 


Basophils (%) (Auto)  0 % (0-3)   


 


Neutrophils # (Auto)


 


 8.6 x10^3/uL


(1.8-7.7) 


 





 


Lymphocytes # (Auto)


 


 0.8 x10^3/uL


(1.0-4.8) 


 





 


Monocytes # (Auto)


 


 0.6 x10^3/uL


(0.0-1.1) 


 





 


Eosinophils # (Auto)


 


 0.0 x10^3/uL


(0.0-0.7) 


 





 


Basophils # (Auto)


 


 0.0 x10^3/uL


(0.0-0.2) 


 





 


Segmented Neutrophils %  84 % (35-66)   


 


Band Neutrophils %  4 % (0-9)   


 


Lymphocytes %  8 % (24-48)   


 


Monocytes %  4 % (0-10)   


 


Platelet Estimate


 


 Adequate


(ADEQUATE) 


 





 


Anisocytosis  Slight   


 


Prothrombin Time


 


 14.1 SEC


(11.7-14.0) 


 





 


Prothromb Time International


Ratio 


 1.1 (0.8-1.1) 


 


 





 


Activated Partial


Thromboplast Time 


 33 SEC (24-38) 


 


 





 


Sodium Level


 


 144 mmol/L


(136-145) 


 





 


Potassium Level


 


 4.0 mmol/L


(3.5-5.1) 


 





 


Chloride Level


 


 104 mmol/L


() 


 





 


Carbon Dioxide Level


 


 30 mmol/L


(21-32) 


 





 


Anion Gap  10 (6-14)   


 


Blood Urea Nitrogen


 


 22 mg/dL


(7-20) 


 





 


Creatinine


 


 0.9 mg/dL


(0.6-1.0) 


 





 


Estimated GFR


(Cockcroft-Gault) 


 63.2 


 


 





 


BUN/Creatinine Ratio  24 (6-20)   


 


Glucose Level


 


 94 mg/dL


(70-99) 


 





 


Lactic Acid Level


 


 1.8 mmol/L


(0.4-2.0) 


 





 


Calcium Level


 


 8.9 mg/dL


(8.5-10.1) 


 





 


Magnesium Level


 


 2.3 mg/dL


(1.8-2.4) 


 





 


Total Bilirubin


 


 0.5 mg/dL


(0.2-1.0) 


 





 


Aspartate Amino Transf


(AST/SGOT) 


 50 U/L (15-37) 


 


 





 


Alanine Aminotransferase


(ALT/SGPT) 


 86 U/L (14-59) 


 


 





 


Alkaline Phosphatase


 


 189 U/L


() 


 





 


Troponin I Quantitative


 


 < 0.017 ng/mL


(0.000-0.055) < 0.017 ng/mL


(0.000-0.055) 





 


NT-Pro-B-Type Natriuretic


Peptide 


 5235 pg/mL


(0-124) 


 





 


Total Protein


 


 6.8 g/dL


(6.4-8.2) 


 





 


Albumin


 


 3.4 g/dL


(3.4-5.0) 


 





 


Albumin/Globulin Ratio  1.0 (1.0-1.7)   


 


Procalcitonin


 


 < 0.10 ng/mL


(0.00-0.10) 


 





 


Thyroid Stimulating Hormone


(TSH) 


 2.132 uIU/mL


(0.358-3.74) 


 





 


Urine Collection Type    Unknown 


 


Urine Color    Yellow 


 


Urine Clarity    Clear 


 


Urine pH    8.0 (<5.0-8.0) 


 


Urine Specific Gravity


 


 


 


 1.020


(1.000-1.030)


 


Urine Protein


 


 


 


 Negative mg/dL


(NEG-TRACE)


 


Urine Glucose (UA)


 


 


 


 Negative mg/dL


(NEG)


 


Urine Ketones (Stick)


 


 


 


 Negative mg/dL


(NEG)


 


Urine Blood    Negative (NEG) 


 


Urine Nitrite    Negative (NEG) 


 


Urine Bilirubin    Negative (NEG) 


 


Urine Urobilinogen Dipstick


 


 


 


 0.2 mg/dL (0.2


mg/dL)


 


Urine Leukocyte Esterase    Trace (NEG) 


 


Urine RBC    0 /HPF (0-2) 


 


Urine WBC    Occ /HPF (0-4) 


 


Urine Squamous Epithelial


Cells 


 


 


 Many /LPF 





 


Urine Bacteria


 


 


 


 Few /HPF


(0-FEW)


 


Urine Mucus    Slight /LPF 


 


Urine Opiates Screen    Neg (NEG) 


 


Urine Methadone Screen    Neg (NEG) 


 


Urine Barbiturates    Neg (NEG) 


 


Urine Phencyclidine Screen    Neg (NEG) 


 


Urine


Amphetamine/Methamphetamine 


 


 


 Neg (NEG) 





 


Urine Benzodiazepines Screen    Neg (NEG) 


 


Urine Cocaine Screen    Neg (NEG) 


 


Urine Cannabinoids Screen    Neg (NEG) 


 


Urine Ethyl Alcohol    Neg (NEG) 


 


Test


 1/4/21


21:48 


 


 





 


Troponin I Quantitative


 < 0.017 ng/mL


(0.000-0.055) 


 


 











Medications





Active Scripts








 Medications  Dose


 Route/Sig


 Max Daily Dose Days Date Category Dose


Instructions


 


 Doxycycline


 Hyclate 100 Mg


 Capsule  1 Cap


 PO BID


   12/30/20 Rx 


 


 Prednisone 50 Mg


 Tablet  1 Tab


 PO DAILY


   12/30/20 Rx 


 


 Amiodarone Hcl


 200 Mg Tablet  1 Tab


 PO DAILY


   12/28/20 Reported 


 


 Aspirin Ec


  (Aspirin) 81 Mg


 Tablet.dr  81 Mg


 PO DAILYWBKFT


  30 11/19/20 Rx 


 


 Metoprolol


 Tartrate 25 Mg


 Tablet  25 Mg


 PO BID


   11/7/20 Rx 


 


 Omeprazole 20 Mg


 Capsule.dr  1 Cap


 PO BID


   11/7/20 Reported 


 


 Proair Hfa


  (Albuterol


 Sulfate) 8.5 Gm


 Hfa.aer.ad  1 Puff


 INH PRN Q6HRS PRN


   2/10/19 Reported 


 


 Quetiapine


 Fumarate 50 Mg


 Tablet  50 Mg


 PO HS


   2/10/19 Reported 


 


 Doxepin Hcl 50 Mg


 Capsule  50 Mg


 PO HS


   2/10/19 Reported 


 


 Docusate Sodium


 100 Mg Capsule  1 Cap


 PO QODAY


   2/10/19 Reported 


 


 Amitriptyline Hcl


 25 Mg Tablet  1 Tab


 PO QHS


   2/10/19 Reported 


 


 Multi-Day


 Vitamins


  (Multivitamin) 1


 Each Tablet  1 Tab


 PO DAILY


   5/19/17 Reported 


 


 Zetia (Ezetimibe)


 10 Mg Tablet  10 Mg


 PO DAILY


   2/11/14 Reported  LAST DOSE GIVEN:


 This morning


 


 NEXT DOSE DUE:


 Tomorrow morning


 TIME:9:00 p.m.


 


 Fluoxetine Hcl 40


 Mg Capsule  40 Mg


 PO DAILY08


   1/6/14 Reported  LAST DOSE GIVEN:


 This morning


 


 NEXT DOSE DUE:


 Tomorrow morning


 TIME:9:00 a.m.


 


 Carisoprodol 350


 Mg Tablet  350 Mg


 PO QID


   1/6/14 Reported  LAST DOSE GIVEN:


 this morning


 


 NEXT DOSE DUE:


 this afternoon


 TIME:9:00 p.m.


 


 Atorvastatin


 Calcium 40 Mg


 Tablet  40 Mg


 PO HS


   1/6/14 Reported  LAST DOSE GIVEN:


 last night


 


 NEXT DOSE DUE:


 take again tonight


 TIME:9:00 p.m.








Comments


CXR


IMPRESSION: Stable diffuse interstitial infiltrate.





Impression


.


IMPRESSION:


1.  Acute hypoxic respiratory failure secondary to multifactorial etiologies


including combination of acute exacerbation of chronic obstructive pulmonary


disease and suspected diastolic heart failure.


2.  The patient with 40 years of tobacco use and still smoking cigarettes


consistent with COPD.


3.  History of atrial fibrillation.


4.  History of diastolic congestive heart failure.





Plan


.


RECOMMENDATIONS:


Continue present oxygen to keep saturation 92 and above, currently on room air 


6 min walk prior to discharge 


Continue Empiric antibiotic for now for acute bronchitis.


Continue DuoNebs.


Await Covid results 


Continue Steroids change to po with slow taper 


DVT/GI PPX 





D/W Rn and Dr. Junior 





ok to Discharge home today after 6 min walk from our standpoint











JARED LAM MD                  Jan 6, 2021 09:07

## 2021-01-06 NOTE — SNU/HH DC
DISCHARGE WITH HOME HEALTH


DISCHARGE INFORMATION:


Discharge Date:  Jan 6, 2021


Final Diagnosis:


Problems


Medical Problems:


(1) Bilateral pulmonary infiltrates on CXR


Status: Acute  





(2) Shortness of breath


Status: Acute  








Condition on Discharge:  Stable





CODE STATUS:


Code Status:  Full





HOME HEALTH:


Face to Face:


I certify this patient is under my care and that I, or a nurse practitioner or 

physician's assistant working with me, had a face to face encounter that meets 

the physician face to face encounter requirements with this patient on [].


Medical Complications:  COPD


RN For Eval/Treatment:  Yes


Physical Therapy For:  Evalulation/Treatment


MSW For:  [employer]


Pt Meets Homebound Status:  Poor coordination w/ amb.





POST DISCHARGE ORDERS:


Activity Instructions for Disc:  Activity as tolerated


Weight Bearing Status after Di:  As tolerated


Bathing Instructions:  Shower-keep dressing dry


DIET AFTER DISCHARGE:  Cardiac


Wound/Incision Care:  Keep wound/cast CDI, Change dressing





CHECKS AFTER DISCHARGE:


Checks after discharge:  Check blood press - daily, Weigh Yourself Daily





TREATMENT/EQUIPMENT ORDERS:


Adaptive Equipment Issued:  None, Platform walker


Discharge Respiratory Equipmen:  Oxygen





CERTIFICATION STATEMENT:


Certification Statement:


Certification Statement: Based on the above finding, I certify that this patient

 is confined to the home and needs intermittent skilled nursing care, physical 

therapy and/or speech therapy, or continues to need occupational therapy.~ This 

patient is under my care, and I have initiated the establishment of the plan of 

care.~ This patient will be followed by myself or a community physician who will

 periodically review the plan of care.


Home Meds


Active Scripts


Varenicline Tartrate (CHANTIX) 1 Mg Tablet, 1 MG PO BID for smoking for 30 Days,

 #60 TAB


   Prov:ALBAN WOODS MD         1/6/21


Doxycycline Hyclate (DOXYCYCLINE HYCLATE) 100 Mg Capsule, 1 CAP PO BID for copd,

 #14 CAP


   Prov:ALBAN WOODS MD         1/6/21


Doxycycline Hyclate (DOXYCYCLINE HYCLATE) 100 Mg Capsule, 1 CAP PO BID for 

bronchitis, #14 CAP


   Prov:ALBAN WOODS MD         12/30/20


Prednisone (PREDNISONE) 50 Mg Tablet, 1 TAB PO DAILY for copd, #5 TAB


   Prov:ALBAN WOODS MD         12/30/20


Aspirin (ASPIRIN EC) 81 Mg Tablet., 81 MG PO DAILYWBKFT for cad for 30 Days, 

#30 TAB.SR


   Prov:ALBAN WOODS MD         11/19/20


Metoprolol Tartrate (METOPROLOL TARTRATE) 25 Mg Tablet, 25 MG PO BID for FOR 

HYPERTENSION, #60 TAB 0 Refills


   Prov:BOZENA ST MD         11/7/20


Reported Medications


Amiodarone Hcl (AMIODARONE HCL) 200 Mg Tablet, 1 TAB PO DAILY for AFIB, #90 TAB 

1 Refill


   12/28/20


Omeprazole (OMEPRAZOLE) 20 Mg Capsule.dr, 1 CAP PO BID for reflux, #30 CAP 5 

Refills


   11/7/20


Albuterol Sulfate (Proair Hfa) 8.5 Gm Hfa.aer.ad, 1 PUFF INH PRN Q6HRS PRN for 

SHORTNESS OF BREATH, INHALER


   2/10/19


Quetiapine Fumarate (QUETIAPINE FUMARATE) 50 Mg Tablet, 50 MG PO HS for sleep


   2/10/19


Doxepin Hcl (DOXEPIN HCL) 50 Mg Capsule, 50 MG PO HS for for sleep


   2/10/19


Docusate Sodium (DOCUSATE SODIUM) 100 Mg Capsule, 1 CAP PO QODAY for 

constipation , #14 CAP


   2/10/19


Amitriptyline Hcl (AMITRIPTYLINE HCL) 25 Mg Tablet, 1 TAB PO QHS for isomina, 

#30 TAB 5 Refills


   2/10/19


Multivitamin (MULTI-DAY VITAMINS) 1 Each Tablet, 1 TAB PO DAILY, #30 TAB


   5/19/17


Ezetimibe (ZETIA) 10 Mg Tablet, 10 MG PO DAILY for high cholesterol


   LAST DOSE GIVEN:


   This morning


   


   NEXT DOSE DUE:


   Tomorrow morning


   TIME:9:00 p.m.


   2/11/14


Fluoxetine Hcl (FLUOXETINE HCL) 40 Mg Capsule, 40 MG PO DAILY08 for 

antidepressant


   LAST DOSE GIVEN:


   This morning


   


   NEXT DOSE DUE:


   Tomorrow morning


   TIME:9:00 a.m.


   1/6/14


Carisoprodol (CARISOPRODOL) 350 Mg Tablet, 350 MG PO QID for MUSCLE SPASMS


   LAST DOSE GIVEN:


   this morning


   


   NEXT DOSE DUE:


   this afternoon


   TIME:9:00 p.m.


   1/6/14


Atorvastatin Calcium (ATORVASTATIN CALCIUM) 40 Mg Tablet, 40 MG PO HS for high 

cholesterol


   LAST DOSE GIVEN:


   last night


   


   NEXT DOSE DUE:


   take again tonight


   TIME:9:00 p.m.


   1/6/14











ALBAN WOODS MD              Jan 6, 2021 09:16

## 2021-01-06 NOTE — NUR
ANGELO following for discharge planning. ANGELO spoke with RN and reviewed chart. Discharge orders 
written for HH. ANGELO spoke with pt. Pt refusing HH. Pt stated she has HCBS through [facility]. SW 
explained the differences between HH and HCBS attendant care through Medicaid. Pt again 
declined the need for HH. Pt to discharge home today, self-care. SW awaiting results of 6 
min walk to determine home 02 needs. Pt stated no preference in 02 provider. ANGELO did complete 
patient choice of vendor form and sent initial referral to Pamela with [facility]. ANGELO following.

-------------------------------------------------------------------------------

Addendum: 01/06/21 at 1701 by RC STEPHENS

-------------------------------------------------------------------------------

6 min walk results indicate no need for home 02. Possible out-patient pulmonary follow up 
for this patient. No further SW needs at this time. Pt to discharge home today, 1/6 
self-care.

## 2021-01-06 NOTE — PDOC
PROGRESS NOTES


Date of Service:


DATE: 1/6/21 


TIME: 09:11





Subjective


Subjective


feels ok





Objective


Objective





Vital Signs








  Date Time  Temp Pulse Resp B/P (MAP) Pulse Ox O2 Delivery O2 Flow Rate FiO2


 


1/6/21 08:31  70  136/57    


 


1/6/21 08:30      Nasal Cannula 2.0 


 


1/6/21 03:41 97.5  18  97   





 97.5       














Intake and Output 


 


 1/6/21





 07:00


 


Intake Total 2170 ml


 


Balance 2170 ml


 


 


 


Intake Oral 1920 ml


 


IV Total 250 ml


 


# Voids 5











Physical Exam


Abdomen:  Soft


Heart:  Regular rate, Normal S1, Normal S2


Extremities:  No clubbing


General:  Alert


HEENT:  Atraumatic


MUSCULOSKELETAL:  No deformity, Osteoarthritic changes both hands


Neck:  No JVD


Neuro:  Normal speech


Psych/Mental Status:  Mental status NL


Skin:  No breakdown





Diagnosis


Problem List


Problems


Medical Problems:


(1) Bilateral pulmonary infiltrates on CXR


Status: Acute  





(2) Shortness of breath


Status: Acute  











Assessment


Assessment


Problems


Medical Problems:


(1) Bilateral pulmonary infiltrates on CXR


Status: Acute  





(2) Shortness of breath


Status: Acute  





FINAL IMPRESSION:


1.  Chronic obstructive pulmonary disease with acute exacerbation.


2.  Chronic obstructive pulmonary disease.


3.  Tobacco addiction, smoking history.


4.  Coronary artery disease, history of bypass surgery.


5.  History of lung cancer, radiation treatment.


6.  Hypertension.





PLAN:  6 mts walk


chetna eoxygen


d/c home later today 


vedio swallow study


chantix for smoking





At this time, the patient may need home oxygen.  The patient has a


nebulizer at home.  Continue prednisone, IV Solu-Medrol, IV Zithromax and


Rocephin and Pulmonary consult.  DVT prophylaxis and COVID test was ordered and


see how she does and smoking counseling was done.





Plan


Plan of Care


Problems


Medical Problems:


(1) Bilateral pulmonary infiltrates on CXR


Status: Acute  





(2) Shortness of breath


Status: Acute  








Comment


Review of Relevant


I have reviewed the following items tonia (where applicable) has been applied.


Labs





Microbiology


1/4/21 Blood Culture - Preliminary, Resulted


         NO GROWTH AFTER 1 DAY


Medications





Current Medications


Albuterol Sulfate (Ventolin Neb Soln) 2.5 mg PRN Q6HRS  PRN INH SHORTNESS OF 

BREATH;  Start 1/5/21 at 12:13


Amitriptyline HCl (Elavil) 25 mg QHS PO  Last administered on 1/5/21at 23:29;  

Start 1/5/21 at 21:00


Atorvastatin Calcium (Lipitor) 40 mg HS PO  Last administered on 1/5/21at 23:29;

 Start 1/5/21 at 21:00


Azithromycin 250 mg/Sodium Chloride 250 ml @  250 mls/hr Q24H IV  Last ad

ministered on 1/5/21at 17:42;  Start 1/5/21 at 16:00;  Stop 1/8/21 at 16:59


Ceftriaxone Sodium (Rocephin) 1 gm Q24H IVP  Last administered on 1/5/21at 

15:49;  Start 1/5/21 at 14:00


Docusate Sodium (Colace) 100 mg QODAY PO  Last administered on 1/6/21at 08:31;  

Start 1/6/21 at 09:00


Doxepin HCl (SINEquan) 50 mg QHS PO  Last administered on 1/5/21at 23:31;  Start

1/5/21 at 21:00


Furosemide (Lasix) 40 mg 1X  ONCE IVP  Last administered on 1/5/21at 10:52;  

Start 1/5/21 at 10:30;  Stop 1/5/21 at 10:33;  Status DC


Lactobacillus Rhamnosus (Culturelle) 1 cap BID PO  Last administered on 1/6/21at

08:29;  Start 1/5/21 at 21:00


Vitals/I & O





Vital Sign - Last 24 Hours








 1/5/21 1/5/21 1/5/21 1/5/21





 11:00 15:00 19:55 20:00


 


Temp 97.8 97.8 98.1 





 97.8 97.8 98.1 


 


Pulse 78 92 80 


 


Resp 18 18 24 


 


B/P (MAP) 129/64 (85) 141/68 (92) 148/67 (94) 


 


Pulse Ox 98 97 97 


 


O2 Delivery Nasal Cannula Nasal Cannula Nasal Cannula Nasal Cannula


 


O2 Flow Rate 3.0 3.0 3.0 3.0


 


    





    





 1/5/21 1/5/21 1/5/21 1/6/21





 23:29 23:43 23:52 00:43


 


Temp   97.9 





   97.9 


 


Pulse   82 


 


Resp   20 


 


B/P (MAP) 148/67  144/66 (92) 


 


Pulse Ox   99 


 


O2 Delivery  Nasal Cannula Nasal Cannula Nasal Cannula


 


O2 Flow Rate  3.0 3.0 2.0


 


    





    





 1/6/21 1/6/21 1/6/21 1/6/21





 03:41 08:29 08:30 08:31


 


Temp 97.5   





 97.5   


 


Pulse 70 70  70


 


Resp 18   


 


B/P (MAP) 136/57 (83) 136/57  136/57


 


Pulse Ox 97   


 


O2 Delivery Nasal Cannula  Nasal Cannula 


 


O2 Flow Rate 3.0  2.0 














Intake and Output   


 


 1/5/21 1/5/21 1/6/21





 15:00 23:00 07:00


 


Intake Total 590 ml 890 ml 690 ml


 


Balance 590 ml 890 ml 690 ml











Justifications for Admission


Other Justification














ALBAN WOODS MD              Jan 6, 2021 09:11

## 2021-01-10 NOTE — DS
DATE OF DISCHARGE:  01/06/2021



REASON FOR ADMISSION TO THE HOSPITAL:  COPD with acute exacerbation.



CONSULTATION:  Dr. Coleman.



PROCEDURES DONE:  None.



COMPLICATIONS NOTED:  None.



HOSPITAL COURSE:  This is the third admission for COPD with exacerbation.  The

patient has chronic COPD, continues to smoke.  The patient was seen by

Pulmonology.  Chest x-ray shows chronic scarring.  The patient had a history of

COPD, lung cancer, had radiation treatment in the previous admissions in last

one month.  She had a CT of the chest, seen by Radiation Oncology, does not

think there is any recurrence of cancer at this point.  The patient was

requiring oxygen.  COVID test was negative and the patient was seen by

Pulmonology, was given Solu-Medrol, IV antibiotics.  The patient was feeling

better.  She was discharged home with oxygen with home health and smoking

counseling was done.  The patient has a history of coronary artery disease,

previous bypass surgery and also seen by Cardiology recently.  History of lung

cancer, had radiation treatment.



FINAL DIAGNOSES:

1.  Chronic obstructive pulmonary disease with acute exacerbation.

2.  Hypoxia, requiring oxygen.

3.  Smoking addiction, making it worse.  Counseling was done.

4.  Coronary artery disease, history of bypass surgery.

5.  Paroxysmal atrial fibrillation, not a good candidate for anticoagulation

    secondary to multiple falls.

6.CAD s/p CABG.

7.H/o Lung cancer s/p radiation treatment.



d/c home with oxygen.

smoking counseling done start on chantix

 



______________________________

ALBAN WOODS MD



DR:  LINETTE/rosalind  JOB#:  [id number] / [id number]

DD:  01/10/2021 10:37  DT:  01/10/2021 10:46

TYREE

## 2021-01-10 NOTE — PDOC
Provider Note


Date of Service:


DATE: 1/10/21 


TIME: 10:38


Provider Note


Discharge summary dictated.#[id number].





Justifications for Admission


Other Justification














ALBAN WOODS MD             Jonh 10, 2021 10:38

## 2021-02-02 NOTE — RAD
PQRS Compliance Statement:



One or more of the following individualized dose reduction techniques were utilized for this examinat
ion:  

1. Automated exposure control  

2. Adjustment of the mA and/or kV according to patient size  

3. Use of iterative reconstruction technique





CT THORAX W



Clinical Indication: Reason: SMALL CELL LUNG CA / 



Comparison: CTA chest November 17, 2020.



Technique: Helical CT imaging of the abdomen and pelvis is performed after 75 cc of Omnipaque 300 IV 
contrast. Oral contrast not administered.



Findings: 

Thyroid is symmetric. There is coronary artery disease, changes of CABG. Median sternotomy, the garcia
um is not fused. There is no central pulmonary embolus. No thoracic aortic dissection. Cardiac size i
s normal, no pericardial effusion.



Right infrahilar mass measures approximately 4.7 cm AP by 3.4 cm transverse, previously 5.2 x 3.6 cm.
 Left pleural effusion has resolved. There is trace right pleural effusion, decreased from prior stud
y. Mild upper lobe emphysema.



Multiple surgical clips in the left upper abdomen. Adrenal glands are normal.



Chronic compression fracture at the superior endplate of T6 is stable.



IMPRESSION:

1.  Right infrahilar mass is mildly smaller.

2.  Trace right pleural effusion, decreased from prior study. Left pleural effusion is resolved.



Electronically signed by: Abdon Baltazar MD (2/2/2021 12:04 PM) [id number]

## 2021-02-24 NOTE — RAD
Fluoroscopic swallow study without comparison for dysphagia.



TECHNIQUE AND FINDINGS: Continuous videofluoroscopy was utilized to obtain

images the patient consuming various consistencies of barium. Penetration

producing cough was observed with thin barium, and was not alleviated with

chin tuck maneuver. Please refer to the speech pathology report for detailed

description of the findings.



IMPRESSION:

1. Penetration producing a cough with thin barium. Please refer to speech

pathology report for additional details.



Fluoroscopy time: 3.1 minutes. Images: No static images were recorded, however

continuous videofluoroscopy was utilized.

## 2021-05-26 NOTE — RAD
EXAM: Chest CT without intravenous contrast.



HISTORY: Small cell lung cancer.



TECHNIQUE: Computed tomographic images of the chest were obtained without contrast. Multiplanar refor
matting was performed.



*One or more of the following individualized dose reduction techniques were utilized for this examina
tion:  

1. Automated exposure control.  

2. Adjustment of the mA and/or kV according to patient size.  

3. Use of iterative reconstruction technique.



COMPARISON: 2/1/2021.



FINDINGS: There has been interval increase in masslike right hilar and perihilar soft tissue density 
with surrounding groundglass, consistent with known primary neoplasm and possible superimposed treatm
ent changes. There has been interval increase in a small right pleural effusion. There is mild emphys
ema. There is no pneumothorax. There is a 10 mm nodular opacity within the superior segment of the le
ft lower lobe likely due to subsegmental atelectasis. There are few additional tiny peripheral and pl
eural-based nodular opacities which are benign in appearance.



The heart is mildly enlarged. There is evidence of prior CABG. There is aortic and aortic branch vess
el atherosclerosis. The right hilar lymph nodes are obscured due to the aforementioned mass. There ar
e stable nonspecific mediastinal lymph nodes. There is no acute finding involving the upper abdomen. 
There is no acute or suspicious osseous finding. There is a chronic moderate compression fracture wit
h superior endplate Schmorl's node at T6.



IMPRESSION: 

1. Slight interval increase in masslike right hilar and perihilar soft tissue density with surroundin
g groundglass, consistent with known primary neoplasm and suspected superimposed interval treatment c
hanges.

2. Slight interval increase in a small right pleural effusion.

3. Emphysema.

4. Mild cardiomegaly and evidence of prior CABG.



Electronically signed by: Karla Angulo MD (5/26/2021 2:44 PM) [id number]

## 2021-10-11 ENCOUNTER — HOSPITAL ENCOUNTER (OUTPATIENT)
Dept: HOSPITAL 61 - CT | Age: 66
End: 2021-10-11
Attending: INTERNAL MEDICINE
Payer: COMMERCIAL

## 2021-10-11 DIAGNOSIS — C34.31: Primary | ICD-10-CM

## 2021-10-11 DIAGNOSIS — S22.050B: ICD-10-CM
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
## 2021-10-11 DIAGNOSIS — Z98.890: ICD-10-CM

## 2021-10-11 DIAGNOSIS — J43.2: ICD-10-CM

## 2021-10-11 PROCEDURE — 71250 CT THORAX DX C-: CPT

## 2021-10-11 NOTE — RAD
EXAM: CT OF THE CHEST WITHOUT CONTRAST.



HISTORY: Small cell lung cancer.



TECHNIQUE: Computed tomography of the chest was performed without intravenous contrast. One or more o
f the following individualized dose reduction techniques were utilized for this examination:  

1. Automated exposure control.  

2. Adjustment of the mA and/or kV according to patient size.  

3. Use of iterative reconstruction technique.



COMPARISON: 05/26/2021.



FINDINGS: Images of the upper abdomen reveal postoperative changes along the stomach. Bone windows re
veal a moderate compression deformity at the superior endplate of T6, unchanged.



There are no pathologically enlarged mediastinal or axillary lymph nodes. There is no pericardial eff
usion. The heart is not enlarged. There are changes of coronary artery bypass grafting. The main pulm
onary artery measures 3.8 cm.



A small right pleural effusion is unchanged. Posttreatment fibrosis about the right hilum is unchange
d. There is no clear recurrent mass. There is mild centrilobular and paraseptal emphysema in the apic
es.



IMPRESSION:

1. Stable posttreatment changes about the right hilum. No evidence of active disease.

2. Stable trace right pleural effusion.

3. Mild centrilobular and paraseptal emphysema. Enlargement of the central pulmonary arteries suggest
s pulmonary arterial hypertension.

4. Stable moderate T6 compression deformity with a residual open fracture line at the superior endpla
te.



Electronically signed by: BONIFACIO Hernandez MD (10/11/2021 5:08 PM) [id number]

## 2021-10-29 ENCOUNTER — HOSPITAL ENCOUNTER (OUTPATIENT)
Dept: HOSPITAL 61 - ONCLAB | Age: 66
End: 2021-10-29
Attending: INTERNAL MEDICINE
Payer: COMMERCIAL

## 2021-10-29 DIAGNOSIS — C34.31: Primary | ICD-10-CM

## 2021-10-29 LAB
ALBUMIN SERPL-MCNC: 2.6 G/DL (ref 3.4–5)
ALBUMIN/GLOB SERPL: 0.7 {RATIO} (ref 1–1.7)
ALP SERPL-CCNC: 127 U/L (ref 46–116)
ALT SERPL-CCNC: 25 U/L (ref 14–59)
ANION GAP SERPL CALC-SCNC: 6 MMOL/L (ref 6–14)
ANISOCYTOSIS BLD QL SMEAR: PRESENT
AST SERPL-CCNC: 18 U/L (ref 15–37)
BASOPHILS # BLD AUTO: 0.1 X10^3/UL (ref 0–0.2)
BASOPHILS NFR BLD: 1 % (ref 0–3)
BILIRUB SERPL-MCNC: 0.2 MG/DL (ref 0.2–1)
BUN SERPL-MCNC: 21 MG/DL (ref 7–20)
BUN/CREAT SERPL: 26 (ref 6–20)
CALCIUM SERPL-MCNC: 8.4 MG/DL (ref 8.5–10.1)
CHLORIDE SERPL-SCNC: 103 MMOL/L (ref 98–107)
CO2 SERPL-SCNC: 35 MMOL/L (ref 21–32)
CREAT SERPL-MCNC: 0.8 MG/DL (ref 0.6–1)
EOSINOPHIL NFR BLD: 0.4 X10^3/UL (ref 0–0.7)
EOSINOPHIL NFR BLD: 4 % (ref 0–3)
ERYTHROCYTE [DISTWIDTH] IN BLOOD BY AUTOMATED COUNT: 21.8 % (ref 11.5–14.5)
GFR SERPLBLD BASED ON 1.73 SQ M-ARVRAT: 72.2 ML/MIN
GLUCOSE SERPL-MCNC: 84 MG/DL (ref 70–99)
HCT VFR BLD CALC: 31.5 % (ref 36–47)
HGB BLD-MCNC: 9.7 G/DL (ref 12–15.5)
LYMPHOCYTES # BLD: 0.7 X10^3/UL (ref 1–4.8)
LYMPHOCYTES NFR BLD AUTO: 8 % (ref 24–48)
MCH RBC QN AUTO: 25 PG (ref 25–35)
MCHC RBC AUTO-ENTMCNC: 31 G/DL (ref 31–37)
MCV RBC AUTO: 81 FL (ref 79–100)
MONO #: 0.8 X10^3/UL (ref 0–1.1)
MONOCYTES NFR BLD: 8 % (ref 0–9)
NEUT #: 7.5 X10^3/UL (ref 1.8–7.7)
NEUTROPHILS NFR BLD AUTO: 79 % (ref 31–73)
PLATELET # BLD AUTO: 315 X10^3/UL (ref 140–400)
PLATELET # BLD EST: ADEQUATE 10*3/UL
POLYCHROMASIA BLD QL SMEAR: PRESENT
POTASSIUM SERPL-SCNC: 3.6 MMOL/L (ref 3.5–5.1)
PROT SERPL-MCNC: 6.4 G/DL (ref 6.4–8.2)
RBC # BLD AUTO: 3.87 X10^6/UL (ref 3.5–5.4)
SODIUM SERPL-SCNC: 144 MMOL/L (ref 136–145)
WBC # BLD AUTO: 9.4 X10^3/UL (ref 4–11)

## 2021-10-29 PROCEDURE — 83550 IRON BINDING TEST: CPT

## 2021-10-29 PROCEDURE — 85025 COMPLETE CBC W/AUTO DIFF WBC: CPT

## 2021-10-29 PROCEDURE — 80053 COMPREHEN METABOLIC PANEL: CPT

## 2021-10-29 PROCEDURE — 82728 ASSAY OF FERRITIN: CPT

## 2021-10-29 PROCEDURE — 83540 ASSAY OF IRON: CPT

## 2021-10-29 PROCEDURE — 36415 COLL VENOUS BLD VENIPUNCTURE: CPT

## 2022-04-07 ENCOUNTER — HOSPITAL ENCOUNTER (EMERGENCY)
Dept: HOSPITAL 61 - ER | Age: 67
Discharge: HOME | End: 2022-04-07
Payer: COMMERCIAL

## 2022-04-07 VITALS — SYSTOLIC BLOOD PRESSURE: 97 MMHG | DIASTOLIC BLOOD PRESSURE: 61 MMHG

## 2022-04-07 VITALS — WEIGHT: 238.1 LBS | HEIGHT: 66 IN | BODY MASS INDEX: 38.27 KG/M2

## 2022-04-07 DIAGNOSIS — Z87.891: ICD-10-CM

## 2022-04-07 DIAGNOSIS — S81.812A: Primary | ICD-10-CM

## 2022-04-07 DIAGNOSIS — J44.9: ICD-10-CM

## 2022-04-07 DIAGNOSIS — I25.2: ICD-10-CM
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
## 2022-04-07 DIAGNOSIS — I48.91: ICD-10-CM

## 2022-04-07 DIAGNOSIS — Z79.01: ICD-10-CM

## 2022-04-07 DIAGNOSIS — I11.0: ICD-10-CM

## 2022-04-07 DIAGNOSIS — I50.9: ICD-10-CM
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
## 2022-04-07 DIAGNOSIS — Z95.1: ICD-10-CM

## 2022-04-07 DIAGNOSIS — I25.10: ICD-10-CM

## 2022-04-07 DIAGNOSIS — G89.29: ICD-10-CM
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
## 2022-04-07 PROCEDURE — 12004 RPR S/N/AX/GEN/TRK7.6-12.5CM: CPT

## 2022-04-07 PROCEDURE — 99282 EMERGENCY DEPT VISIT SF MDM: CPT

## 2022-04-07 NOTE — PHYS DOC
Past Medical History


Past Medical History:  A-Fib, Anemia, CAD, Cancer, CHF, COPD, Hypertension, MI, 

Pneumonia, Other


Additional Past Medical Histor:  CARDIAC ISSUES,RADIATION/CHEMO FOR RIGHT LUNG 

CA,CHRONIC BACK PAIN,RVR


Past Surgical History:  Coronary Bypass Surgery, Knee Replacement, Other


Additional Past Surgical Histo:  LEFT SHOULDER REPLACEMENT


Smoking Status:  Former Smoker


Alcohol Use:  None


Drug Use:  None





General Adult


EDM:


Chief Complaint:  LACERATION/AVULSION





HPI:


HPI:





Patient is a 67  year old female who presents to the ED today with left shin 

laceration.  Patient is on Eliquis for A. fib, she states she was getting out of

her vehicle her right foot with shoes scratched her left shin.





Review of Systems:


Review of Systems:


Constitutional:   Denies fever or chills. []


Musculoskeletal:   Denies back pain or joint pain. [] 


Integument:   reports left shin laceration


Neurologic:   Denies headache, focal weakness or sensory changes. []  


Psychiatric:  Denies depression or anxiety. []





Heart Score:


C/O Chest Pain:  N/A


Risk Factors:


Risk Factors:  DM, Current or recent (<one month) smoker, HTN, HLP, family 

history of CAD, obesity.


Risk Scores:


Score 0 - 3:  2.5% MACE over next 6 weeks - Discharge Home


Score 4 - 6:  20.3% MACE over next 6 weeks - Admit for Clinical Observation


Score 7 - 10:  72.7% MACE over next 6 weeks - Early Invasive Strategies





Current Medications:





Current Medications








 Medications


  (Trade)  Dose


 Ordered  Sig/[facility]  Start Time


 Stop Time Status Last Admin


Dose Admin


 


 Lidocaine/


 Epinephrine


  (LIDOCAINE


 1%-EPI 1:100,000


 Multi-Dose)  20 ml  1X  ONCE  4/7/22 15:15


 4/7/22 15:17 DC  





 


 Tetracaine/


 Epinephrine/


 Lidocaine


  (Let


  (Lido-Epineph-Tetra)


 Gel)  12 ml  1X  ONCE  4/7/22 15:15


 4/7/22 15:17 DC  














Allergies:


Allergies:





Allergies








Coded Allergies Type Severity Reaction Last Updated Verified


 


  tramadol Allergy Severe Seizures 2/24/22 Yes


 


  Sulfa (Sulfonamide Antibiotics) Allergy Intermediate Itching & GI upset 

2/24/22 Yes


 


  lorazepam Allergy Intermediate Hives 2/24/22 Yes


 


  propoxyphene napsylate Allergy Intermediate Hives & GI upset 2/24/22 Yes


 


  morphine Adverse Reaction Severe  2/24/22 Yes


 


  ibuprofen Adverse Reaction Intermediate NAUSEA/VOMITING 2/24/22 Yes











Physical Exam:


PE:





Constitutional: Well developed, well nourished, no acute distress, non-toxic 

appearance. []


Skin: Left mid shin with a skin tear roughly 8X4 cm slight bleeding continues, 

pressure applied to the area.  Neurovascular exam is intact to the left lower 

extremity.


Back: No tenderness, no CVA tenderness. [] 


Extremities: No tenderness, no cyanosis, no clubbing, ROM intact, no edema. [] 


Neurologic: Alert and oriented X 3, normal motor function, normal sensory 

function, no focal deficits noted. []


Psychologic: Affect normal, judgement normal, mood normal. []





Current Patient Data:


Vital Signs:





                                   Vital Signs








  Date Time  Temp Pulse Resp B/P (MAP) Pulse Ox O2 Delivery O2 Flow Rate FiO2


 


4/7/22 14:45 97.9 18 18 97/61 (73) 96   





 97.9       











EKG:


EKG:


[]





Radiology/Procedures:


Radiology/Procedures:


Laceration/Wound Repair





   Wound Location:left shin


   Wound's Depth, Shape:  V


   Wound Length (cm):  8x4cm


   Wound Explored:  clean


   Irrigated w/ Saline (ccs): 100ml of NS


   Betadine Prep?: Y


   Anesthesia: 12 cc of let solution was used initially then 4 cc of lidocaine 

with epinephrine used later, bleeding completely stopped


   Wound Repaired With: Ethilon


   Suture Size/Type: 5.0 and 6.0/interrupted sutures


   Number of Sutures: 17


Progress  : Wound was covered with nonstick dressing





Course & Med Decision Making:


Course & Med Decision Making


Pertinent Labs and Imaging studies reviewed. (See chart for details)





This a 67-year-old female patient on Eliquis presenting with left shin 

laceration.  Tetanus is up-to-date.





Tetanus is up-to-date, laceration was closed by me as noted in procedures, wound

 care instructions and return precautions provided





Dragon Disclaimer:


Dragon Disclaimer:


This electronic medical record was generated, in whole or in part, using a voice

 recognition dictation system.





Departure


Departure


Impression:  


   Primary Impression:  


   Laceration of left lower leg


   Qualified Codes:  S81.812A - Laceration without foreign body, left lower leg,

    initial encounter


Disposition:  01 HOME / SELF CARE / HOMELESS


Condition:  STABLE


Referrals:  


ALBAN WOODS MD (PCP)


Please follow-up with your primary care doctor or the emergency room in 7 days 

for stitches


Patient Instructions:  Laceration Care, Adult





Additional Instructions:  


You have a laceration on the left shin that was closed with stitches.  Keep the 

area clean and dry.  You can wash the area once a day and apply Neosporin to the

 area twice a day.  Remove the dressing in 24 hours, leave the area open to air 

if its not bleeding or draining.  Monitor the area for any signs of infection 

including but not limited to increased redness, warmth, yellow drainage or any 

signs of infection return to the ED or see your doctor.  Come back to the ED or 

see your doctor in 7 days for stitches to be removed











SVETA STRICKLAND             Apr 7, 2022 15:32